# Patient Record
Sex: FEMALE | Race: WHITE | Employment: OTHER | URBAN - METROPOLITAN AREA
[De-identification: names, ages, dates, MRNs, and addresses within clinical notes are randomized per-mention and may not be internally consistent; named-entity substitution may affect disease eponyms.]

---

## 2022-04-07 ENCOUNTER — TELEPHONE (OUTPATIENT)
Dept: CARDIOTHORACIC SURGERY | Age: 77
End: 2022-04-07

## 2022-04-07 NOTE — TELEPHONE ENCOUNTER
LVM for patients daughter to call back and set the consultation appt. From [facility] Lopez for Permanent atrial fibrullation.

## 2022-04-14 ENCOUNTER — INITIAL CONSULT (OUTPATIENT)
Dept: CARDIOTHORACIC SURGERY | Age: 77
End: 2022-04-14
Payer: COMMERCIAL

## 2022-04-14 VITALS
OXYGEN SATURATION: 98 % | TEMPERATURE: 97 F | WEIGHT: 195 LBS | SYSTOLIC BLOOD PRESSURE: 112 MMHG | HEIGHT: 62 IN | BODY MASS INDEX: 35.88 KG/M2 | RESPIRATION RATE: 18 BRPM | HEART RATE: 54 BPM | DIASTOLIC BLOOD PRESSURE: 68 MMHG

## 2022-04-14 DIAGNOSIS — Z95.0 HISTORY OF PERMANENT CARDIAC PACEMAKER PLACEMENT: Primary | ICD-10-CM

## 2022-04-14 PROCEDURE — 99203 OFFICE O/P NEW LOW 30 MIN: CPT | Performed by: THORACIC SURGERY (CARDIOTHORACIC VASCULAR SURGERY)

## 2022-04-14 RX ORDER — TRIFLUOPERAZINE HYDROCHLORIDE 1 MG/1
1 TABLET, FILM COATED ORAL NIGHTLY
COMMUNITY
Start: 2021-12-16

## 2022-04-14 RX ORDER — DIGOXIN 125 MCG
125 TABLET ORAL DAILY
COMMUNITY
Start: 2022-03-30

## 2022-04-14 RX ORDER — TRAZODONE HYDROCHLORIDE 50 MG/1
50 TABLET ORAL NIGHTLY
COMMUNITY
Start: 2021-12-16

## 2022-04-14 RX ORDER — NITROGLYCERIN 0.4 MG/1
0.4 TABLET SUBLINGUAL EVERY 5 MIN PRN
Status: ON HOLD | COMMUNITY
Start: 2019-01-29 | End: 2022-09-14

## 2022-04-14 RX ORDER — BENZTROPINE MESYLATE 0.5 MG/1
0.5 TABLET ORAL DAILY
COMMUNITY
Start: 2022-03-17

## 2022-04-14 RX ORDER — POTASSIUM CHLORIDE 750 MG/1
30 TABLET, EXTENDED RELEASE ORAL DAILY
COMMUNITY
Start: 2021-12-29

## 2022-04-14 RX ORDER — METOPROLOL SUCCINATE 25 MG/1
12.5 TABLET, EXTENDED RELEASE ORAL EVERY EVENING
COMMUNITY
Start: 2021-12-29

## 2022-04-14 RX ORDER — BUMETANIDE 1 MG/1
1 TABLET ORAL DAILY
COMMUNITY
Start: 2022-03-11

## 2022-04-14 RX ORDER — SPIRONOLACTONE 25 MG/1
25 TABLET ORAL DAILY
COMMUNITY
Start: 2022-04-12

## 2022-04-14 NOTE — PATIENT INSTRUCTIONS
Hold xarelto 5 days prior to surgery   Communicate with Dr Renee Carosn to time surgery up next month

## 2022-04-14 NOTE — PROGRESS NOTES
[facility] Cardiothoracic Surgery  Consult    Patient's Name/Date of Birth: Emilia Mclean / 1945 (23 y.o.)    Date: April 14, 2022     Chief Complaint: Failed AICD pacemaker placement    HPI: Emilia Mclean is a 78 y.o.  female who presents from [address] [place] [address] Dr. Randy Scanlon after recent attempt of placing a AICD. The pacer with leads were unable to be placed due to patient's anatomy therefore she is seeking evaluation from cardiothoracic surgery Dr. Deann Lowe regarding a mini sternotomy to assist with placing the leads. Patient does suffer with severe congestive heart failure and severe anxiety. She has history of tachybradycardia syndrome. A. fib RVR. Currently she has no chest pain or shortness of breath. She does take anticoagulant Xarelto. ROS:   CONSTITUTIONAL: Mild anxiety  Respiratory: negative shortness of breath  Cardiovascular: Positive dizziness and fatigue with longstanding episodes  Gastrointestinal: Negative abdominal pain  Genitourinary:negative urinary issues  Hematologic/lymphatic: negative bleeding issues  Musculoskeletal: Lower leg weakness  Neurological mild delay cognitive recall chronic  Endocrine: negative diabetes  Psychiatric: Suffers with severe anxiety  No past medical history on file. No past surgical history on file. Allergies   Allergen Reactions    Penicillins Hives     No family history on file.   Social History     Socioeconomic History    Marital status: Unknown     Spouse name: Not on file    Number of children: Not on file    Years of education: Not on file    Highest education level: Not on file   Occupational History    Not on file   Tobacco Use    Smoking status: Never Smoker    Smokeless tobacco: Never Used   Substance and Sexual Activity    Alcohol use: Not on file    Drug use: Not on file    Sexual activity: Not on file   Other Topics Concern    Not on file   Social History Narrative    Not on file     Social Determinants of Health     Financial Resource Strain:     Difficulty of Paying Living Expenses: Not on file   Food Insecurity:     Worried About Running Out of Food in the Last Year: Not on file    Lynne of Food in the Last Year: Not on file   Transportation Needs:     Lack of Transportation (Medical): Not on file    Lack of Transportation (Non-Medical):  Not on file   Physical Activity:     Days of Exercise per Week: Not on file    Minutes of Exercise per Session: Not on file   Stress:     Feeling of Stress : Not on file   Social Connections:     Frequency of Communication with Friends and Family: Not on file    Frequency of Social Gatherings with Friends and Family: Not on file    Attends Spiritism Services: Not on file    Active Member of [address] [place] [address] or Organizations: Not on file    Attends Club or Organization Meetings: Not on file    Marital Status: Not on file   Intimate Partner Violence:     Fear of Current or Ex-Partner: Not on file    Emotionally Abused: Not on file    Physically Abused: Not on file    Sexually Abused: Not on file   Housing Stability:     Unable to Pay for Housing in the Last Year: Not on file    Number of [place] in the Last Year: Not on file    Unstable Housing in the Last Year: Not on file       Current Outpatient Medications   Medication Sig Dispense Refill    bumetanide (BUMEX) 1 MG tablet Take 1 mg by mouth daily      digoxin (LANOXIN) 125 MCG tablet Take 125 mcg by mouth daily      metoprolol succinate (TOPROL XL) 25 MG extended release tablet Take 12.5 mg by mouth daily      nitroGLYCERIN (NITROSTAT) 0.4 MG SL tablet Place 0.4 mg under the tongue every 5 minutes as needed      potassium chloride (KLOR-CON M) 10 MEQ extended release tablet Take 30 mEq by mouth daily      rivaroxaban (XARELTO) 20 MG TABS tablet Take 20 mg by mouth daily      spironolactone (ALDACTONE) 25 MG tablet Take 25 mg by mouth daily      traZODone (DESYREL) 50 MG tablet TAKE 1 TABLET BY MOUTH ONCE DAILY AT NIGHT      trifluoperazine (STELAZINE) 1 MG tablet Take 1 mg by mouth 2 times daily      benztropine (COGENTIN) 0.5 MG tablet TAKE 1 TABLET BY MOUTH ONCE DAILY       No current facility-administered medications for this visit. Physical Exam:  Vitals:    04/14/22 1136   BP: 112/68   Pulse: 54   Resp: 18   Temp: 97 °F (36.1 °C)   SpO2: 98%     Weight: Weight: 195 lb (88.5 kg)    Weight: 195 lb (88.5 kg)        General: Alert and Oriented x3. Sitting up in bed. No apparent distress. HEENT:  Normocephalic and atraumatic. PERRL. EOMI. Lips and oral mucosa moist and without lesions. Neck:  Supple. Trachea midline. Chest:  No abnormality. Equal and symmetric expansion with respiration. Lungs:  Clear to auscultation. Cardiac:  Regular rate and rhythm without murmurs, rubs or gallops. Abdomen:  Soft, non-tender, normoactive bowel sounds. No masses or organomegaly. Extremities:  No cyanosis, clubbing, or edema. Intact pulses in all four extremities. Musculoskeletal:  Intact range of motion of peripheral joints. Normal muscular strength. Neurologic:  Cranial nerves are grossly intact. Non-focal sensory deficits on exam.  Psychiatric: Mood and affect are appropriate.       Imaging Studies:    Cardiac Cath: N/A    Echo: N/A    CT: N/A    Prior Labs reviewed: No labs or concerns noted    Assessment   AICD placement    Risks Reviewed w/pt any sternotomy for lead wire placement  -  - Risk for bleeding:Yes  - Risk for cardiac arrythmias: Yes  - Small risk of death: Yes  - Risks of pneumonia from ventilator: Yes  - Risk for infection: Yes    PLAN:  Ct chest ordered  Patient and CTS will discuss with Dr Oli Medrano from [address] [place] [address] cardiology to discuss   Mini sternotomy for LV lead placement  PAT blood work up to date   Hold Xarelto 5 days prior to surgery   Get all imaging from [facility]  30 minutes spent with patient    Maria Victoria Hanley, APRN - NP, CNP  Phone: [phone]

## 2022-04-27 ENCOUNTER — TELEPHONE (OUTPATIENT)
Dept: CARDIOTHORACIC SURGERY | Age: 77
End: 2022-04-27

## 2022-04-27 NOTE — TELEPHONE ENCOUNTER
Loren Roberts from Dr Elise Conti office called, [id number], and she was asking for the particulars of surgery, day and time. Please advise.

## 2022-04-29 NOTE — TELEPHONE ENCOUNTER
We are awaiting for the two surgeons to discuss a date for surgery together.    Dr. Alma Hebert needs privileges for Vs cath lab  We will have to reach out to Dr Debby Núñez this coming week

## 2022-07-14 ENCOUNTER — HOSPITAL ENCOUNTER (OUTPATIENT)
Dept: CT IMAGING | Age: 77
Discharge: HOME | End: 2022-07-16
Payer: COMMERCIAL

## 2022-07-14 DIAGNOSIS — Z95.0 HISTORY OF PERMANENT CARDIAC PACEMAKER PLACEMENT: ICD-10-CM

## 2022-07-14 PROCEDURE — 71250 CT THORAX DX C-: CPT

## 2022-08-12 ENCOUNTER — TELEPHONE (OUTPATIENT)
Dept: CARDIOTHORACIC SURGERY | Age: 77
End: 2022-08-12

## 2022-08-12 NOTE — TELEPHONE ENCOUNTER
Lorenzo Puentes [phone]  We are awaiting for the two surgeons to discuss a date for surgery together. Dr. Curt Mcdaniel needs privileges for Vs cath lab  We will have to reach out to Dr Elysa Felty this coming week  This has been 4 months they have been waiting.   This has been scheduled

## 2022-09-08 ENCOUNTER — HOSPITAL ENCOUNTER (OUTPATIENT)
Dept: GENERAL RADIOLOGY | Age: 77
Discharge: HOME | End: 2022-09-10
Payer: COMMERCIAL

## 2022-09-08 ENCOUNTER — HOSPITAL ENCOUNTER (OUTPATIENT)
Dept: PREADMISSION TESTING | Age: 77
Discharge: HOME | End: 2022-09-12
Payer: COMMERCIAL

## 2022-09-08 VITALS
DIASTOLIC BLOOD PRESSURE: 84 MMHG | TEMPERATURE: 97.1 F | HEIGHT: 63 IN | BODY MASS INDEX: 33.28 KG/M2 | RESPIRATION RATE: 16 BRPM | SYSTOLIC BLOOD PRESSURE: 101 MMHG | OXYGEN SATURATION: 99 % | HEART RATE: 79 BPM | WEIGHT: 187.83 LBS

## 2022-09-08 LAB
ABSOLUTE EOS #: 0.19 K/UL (ref 0–0.44)
ABSOLUTE IMMATURE GRANULOCYTE: 0.03 K/UL (ref 0–0.3)
ABSOLUTE LYMPH #: 1.36 K/UL (ref 1.1–3.7)
ABSOLUTE MONO #: 0.48 K/UL (ref 0.1–1.2)
ALBUMIN SERPL-MCNC: 3.7 G/DL (ref 3.5–5.2)
ALLEN TEST: POSITIVE
ALP BLD-CCNC: 99 U/L (ref 35–104)
ALT SERPL-CCNC: 8 U/L (ref 5–33)
ANION GAP SERPL CALCULATED.3IONS-SCNC: 9 MMOL/L (ref 9–17)
AST SERPL-CCNC: 18 U/L
BACTERIA: ABNORMAL
BASOPHILS # BLD: 1 % (ref 0–2)
BASOPHILS ABSOLUTE: 0.04 K/UL (ref 0–0.2)
BILIRUB SERPL-MCNC: 0.8 MG/DL (ref 0.3–1.2)
BILIRUBIN URINE: NEGATIVE
BUN BLDV-MCNC: 8 MG/DL (ref 8–23)
BUN/CREAT BLD: 11 (ref 9–20)
CALCIUM SERPL-MCNC: 9.2 MG/DL (ref 8.6–10.4)
CHLORIDE BLD-SCNC: 104 MMOL/L (ref 98–107)
CO2: 28 MMOL/L (ref 20–31)
COLOR: YELLOW
CREAT SERPL-MCNC: 0.76 MG/DL (ref 0.5–0.9)
DIGOXIN LEVEL: 1.1 NG/ML (ref 0.5–2)
EOSINOPHILS RELATIVE PERCENT: 3 % (ref 1–4)
EPITHELIAL CELLS UA: ABNORMAL /HPF (ref 0–5)
GFR AFRICAN AMERICAN: >60 ML/MIN
GFR NON-AFRICAN AMERICAN: >60 ML/MIN
GFR SERPL CREATININE-BSD FRML MDRD: ABNORMAL ML/MIN/{1.73_M2}
GLUCOSE BLD-MCNC: 122 MG/DL (ref 70–99)
GLUCOSE URINE: NEGATIVE
HCT VFR BLD CALC: 42.4 % (ref 36.3–47.1)
HEMOGLOBIN: 13.5 G/DL (ref 11.9–15.1)
IMMATURE GRANULOCYTES: 0 %
INR BLD: 1.8
KETONES, URINE: NEGATIVE
LEUKOCYTE ESTERASE, URINE: ABNORMAL
LYMPHOCYTES # BLD: 19 % (ref 24–43)
MCH RBC QN AUTO: 29.5 PG (ref 25.2–33.5)
MCHC RBC AUTO-ENTMCNC: 31.8 G/DL (ref 28.4–34.8)
MCV RBC AUTO: 92.6 FL (ref 82.6–102.9)
MONOCYTES # BLD: 7 % (ref 3–12)
NITRITE, URINE: POSITIVE
NRBC AUTOMATED: 0 PER 100 WBC
PARTIAL THROMBOPLASTIN TIME: 32.8 SEC (ref 23.9–33.8)
PATIENT TEMP: 37
PDW BLD-RTO: 14.2 % (ref 11.8–14.4)
PH UA: 6 (ref 5–8)
PLATELET # BLD: 182 K/UL (ref 138–453)
PMV BLD AUTO: 11.1 FL (ref 8.1–13.5)
POC HCO3: 24.8 MMOL/L (ref 21–28)
POC O2 SATURATION: 96 % (ref 94–98)
POC PCO2: 36.8 MM HG (ref 35–48)
POC PH: 7.44 (ref 7.35–7.45)
POC PO2: 75.9 MM HG (ref 83–108)
POC TCO2: 25 MMOL/L (ref 22–30)
POSITIVE BASE EXCESS, ART: 1 (ref 0–3)
POTASSIUM SERPL-SCNC: 4.3 MMOL/L (ref 3.7–5.3)
PROTEIN UA: NEGATIVE
PROTHROMBIN TIME: 21.2 SEC (ref 11.5–14.2)
RBC # BLD: 4.58 M/UL (ref 3.95–5.11)
RBC UA: ABNORMAL /HPF (ref 0–2)
SAMPLE SITE: ABNORMAL
SEG NEUTROPHILS: 70 % (ref 36–65)
SEGMENTED NEUTROPHILS ABSOLUTE COUNT: 5.24 K/UL (ref 1.5–8.1)
SODIUM BLD-SCNC: 141 MMOL/L (ref 135–144)
SPECIFIC GRAVITY UA: 1.03 (ref 1–1.03)
TOTAL PROTEIN: 6.7 G/DL (ref 6.4–8.3)
TURBIDITY: ABNORMAL
URINE HGB: ABNORMAL
UROBILINOGEN, URINE: NORMAL
WBC # BLD: 7.3 K/UL (ref 3.5–11.3)
WBC UA: ABNORMAL /HPF (ref 0–5)

## 2022-09-08 PROCEDURE — 82374 ASSAY BLOOD CARBON DIOXIDE: CPT

## 2022-09-08 PROCEDURE — 86403 PARTICLE AGGLUT ANTBDY SCRN: CPT

## 2022-09-08 PROCEDURE — 36600 WITHDRAWAL OF ARTERIAL BLOOD: CPT

## 2022-09-08 PROCEDURE — 87086 URINE CULTURE/COLONY COUNT: CPT

## 2022-09-08 PROCEDURE — 86920 COMPATIBILITY TEST SPIN: CPT

## 2022-09-08 PROCEDURE — 36415 COLL VENOUS BLD VENIPUNCTURE: CPT

## 2022-09-08 PROCEDURE — 71046 X-RAY EXAM CHEST 2 VIEWS: CPT

## 2022-09-08 PROCEDURE — 87186 SC STD MICRODIL/AGAR DIL: CPT

## 2022-09-08 PROCEDURE — 86850 RBC ANTIBODY SCREEN: CPT

## 2022-09-08 PROCEDURE — 86900 BLOOD TYPING SEROLOGIC ABO: CPT

## 2022-09-08 PROCEDURE — 85730 THROMBOPLASTIN TIME PARTIAL: CPT

## 2022-09-08 PROCEDURE — 86901 BLOOD TYPING SEROLOGIC RH(D): CPT

## 2022-09-08 PROCEDURE — 80162 ASSAY OF DIGOXIN TOTAL: CPT

## 2022-09-08 PROCEDURE — 85610 PROTHROMBIN TIME: CPT

## 2022-09-08 PROCEDURE — 80053 COMPREHEN METABOLIC PANEL: CPT

## 2022-09-08 PROCEDURE — 87641 MR-STAPH DNA AMP PROBE: CPT

## 2022-09-08 PROCEDURE — 85025 COMPLETE CBC W/AUTO DIFF WBC: CPT

## 2022-09-08 PROCEDURE — 87077 CULTURE AEROBIC IDENTIFY: CPT

## 2022-09-08 PROCEDURE — 82803 BLOOD GASES ANY COMBINATION: CPT

## 2022-09-08 PROCEDURE — 81001 URINALYSIS AUTO W/SCOPE: CPT

## 2022-09-08 RX ORDER — LEVOTHYROXINE SODIUM 0.03 MG/1
25 TABLET ORAL DAILY
COMMUNITY
Start: 2022-04-21

## 2022-09-08 ASSESSMENT — PAIN SCALES - GENERAL: PAINLEVEL_OUTOF10: 0

## 2022-09-08 NOTE — PRE-PROCEDURE INSTRUCTIONS
given Chlorhexidine, please shower or bathe the morning of surgery using an antibacterial soap. Please dress in clean clothing after showering. General information about Chlorhexidine Gluconate (CHG)   * It should not be used on hair, face, ears, genital area or skin that is not intact. * It should not be used if breast feeding. * It should not be used if you allergic to CHG. * See the bottle for additional information. Do Not apply any powder, deodorant, lotion/cream/oil, perfume/aftershave, cosmetics, or alcohol-based skin or hair products after showering. Do Not shave near the planned surgical site unless specifically instructed to.     1. Wash your hair using your normal shampoo and rinse. 2. Wash your face and genital area (privates) using your own shampoo and rinse. 3. Turn off the shower water and pour half of the bottle of CHG onto a clean washcloth. 4. Wash your body from neck down to toes. Pay special attention to your surgical site. 5. Leave the CHG on your skin for 5 minutes and rinse it off.   6. Pat dry with a clean towel, sleep in clean clothes and clean sheets. 7. Do not shave near the surgical site. 8. Repeat process the morning of surgery. CHG may cause dry skin, but should not cause redness, rash, or intense itching. If an suspect an allergic reaction, use your own soap and shampoo for the morning of surgery and report reaction to the pre-operative nurse. Additional Instructions:      1. If you are having any type of anesthesia, you are to have NOTHING to eat or drink after midnight the night before surgery. This includes no gum, hard candy, mints or water. The only exception to this is small sips of water to take the medications listed above. No smoking or chewing tobacco after midnight. No alcoholic beverages for 24 hours prior to surgery. 2. Brush your teeth but do not swallow any water. 3. If you wear glasses bring a case for them if you have one.   No contacts should be worn the day of surgery. You may also bring your hearing aids. If you have dentures, most surgical procedures involving anesthesia will require that you remove them prior to surgery. 4. If you sleep with a CPAP or BiPAP machine at home and plan on staying in the hospital overnight after surgery, please bring your machine with you. 5. Do not wear any jewelry or body piercings the day of surgery. No nail polish on the operative extremity (arm/hand or leg/foot surgeries)   6. If you are staying overnight with us, you may bring a small bag of necessary personal items. 7. Please wear loose, comfortable clothing. If you are potentially going to have a cast, sling, brace or bulky dressing, make sure to wear clothing that will fit over it. 8. In case of illness - If you have cold or flu like symptoms (high fever, runny nose, sore throat, cough, etc.) rash, nausea, vomiting, loose stools, and/or recent contact with someone who has a contagious disease (chicken pox, measles, COVID-19, etc.). Please call your surgeon before coming to the hospital.    Transportation After Your Surgery/Procedure: If you are going home the same day of surgery you need someone to drive you home. Your  must be at least 25years of age. A taxi cab or other nonmedical public transportation is not acceptable unless you have someone to ride home in the vehicle with you. For your safety, someone must remain with you for the first 24 hours after your surgery if you receive anesthesia or medication. If you do not have someone to stay with you, your procedure may be cancelled. As a patient at [facility] you can expect quality medical and nursing care that is centered on you individual needs. Our goal is to make your surgical experience as comfortable as possible.     Any questions about preparing for your surgery please call [phone].      ____________________________   ____________________________  Signature (Patient)                                 Signature (Nurse)                     Date

## 2022-09-09 LAB
CULTURE: ABNORMAL
CULTURE: ABNORMAL
MRSA, DNA, NASAL: NEGATIVE
SPECIMEN DESCRIPTION: ABNORMAL
SPECIMEN DESCRIPTION: NORMAL

## 2022-09-12 ENCOUNTER — ANESTHESIA EVENT (OUTPATIENT)
Dept: OPERATING ROOM | Age: 77
DRG: 261 | End: 2022-09-12
Payer: COMMERCIAL

## 2022-09-13 ENCOUNTER — ANESTHESIA (OUTPATIENT)
Dept: OPERATING ROOM | Age: 77
DRG: 261 | End: 2022-09-13
Payer: COMMERCIAL

## 2022-09-13 ENCOUNTER — APPOINTMENT (OUTPATIENT)
Dept: GENERAL RADIOLOGY | Age: 77
DRG: 261 | End: 2022-09-13
Attending: THORACIC SURGERY (CARDIOTHORACIC VASCULAR SURGERY)
Payer: COMMERCIAL

## 2022-09-13 ENCOUNTER — HOSPITAL ENCOUNTER (INPATIENT)
Age: 77
LOS: 1 days | Discharge: HOME | DRG: 261 | End: 2022-09-16
Attending: THORACIC SURGERY (CARDIOTHORACIC VASCULAR SURGERY) | Admitting: THORACIC SURGERY (CARDIOTHORACIC VASCULAR SURGERY)
Payer: COMMERCIAL

## 2022-09-13 DIAGNOSIS — Z53.32 THORACOSCOPIC SURGICAL PROCEDURE CONVERTED TO OPEN PROCEDURE: Primary | ICD-10-CM

## 2022-09-13 PROBLEM — I48.11 LONGSTANDING PERSISTENT ATRIAL FIBRILLATION (HCC): Status: ACTIVE | Noted: 2022-09-13

## 2022-09-13 LAB
ANION GAP: 14 MMOL/L (ref 7–16)
DIGOXIN LEVEL: 1 NG/ML (ref 0.5–2)
FIO2: 2
GLUCOSE BLD-MCNC: 158 MG/DL (ref 74–100)
NEGATIVE BASE EXCESS, ART: 2 (ref 0–2)
NEGATIVE BASE EXCESS, ART: 2 (ref 0–2)
O2 DEVICE/FLOW/%: ABNORMAL
POC CHLORIDE: 106 MMOL/L (ref 98–107)
POC HCO3: 22.9 MMOL/L (ref 21–28)
POC HCO3: 24.4 MMOL/L (ref 21–28)
POC HEMATOCRIT: 34 % (ref 36–46)
POC HEMOGLOBIN: 11.7 G/DL (ref 12–16)
POC IONIZED CALCIUM: 1.11 MMOL/L (ref 1.15–1.33)
POC O2 SATURATION: 92 % (ref 94–98)
POC O2 SATURATION: 98 % (ref 94–98)
POC PCO2: 38.4 MM HG (ref 35–48)
POC PCO2: 45.7 MM HG (ref 35–48)
POC PH: 7.33 (ref 7.35–7.45)
POC PH: 7.38 (ref 7.35–7.45)
POC PO2: 112.5 MM HG (ref 83–108)
POC PO2: 69.4 MM HG (ref 83–108)
POC POTASSIUM: 3.1 MMOL/L (ref 3.5–4.5)
POC SODIUM: 142 MMOL/L (ref 138–146)
POC TCO2: 23 MMOL/L (ref 22–30)
SAMPLE SITE: ABNORMAL

## 2022-09-13 PROCEDURE — 6360000002 HC RX W HCPCS: Performed by: NURSE PRACTITIONER

## 2022-09-13 PROCEDURE — 2580000003 HC RX 258: Performed by: NURSE PRACTITIONER

## 2022-09-13 PROCEDURE — 82947 ASSAY GLUCOSE BLOOD QUANT: CPT

## 2022-09-13 PROCEDURE — 2580000003 HC RX 258: Performed by: THORACIC SURGERY (CARDIOTHORACIC VASCULAR SURGERY)

## 2022-09-13 PROCEDURE — 2500000003 HC RX 250 WO HCPCS: Performed by: THORACIC SURGERY (CARDIOTHORACIC VASCULAR SURGERY)

## 2022-09-13 PROCEDURE — A4217 STERILE WATER/SALINE, 500 ML: HCPCS | Performed by: THORACIC SURGERY (CARDIOTHORACIC VASCULAR SURGERY)

## 2022-09-13 PROCEDURE — 2500000003 HC RX 250 WO HCPCS: Performed by: NURSE ANESTHETIST, CERTIFIED REGISTERED

## 2022-09-13 PROCEDURE — 3700000001 HC ADD 15 MINUTES (ANESTHESIA): Performed by: THORACIC SURGERY (CARDIOTHORACIC VASCULAR SURGERY)

## 2022-09-13 PROCEDURE — 71045 X-RAY EXAM CHEST 1 VIEW: CPT

## 2022-09-13 PROCEDURE — 02H40JZ INSERTION OF PACEMAKER LEAD INTO CORONARY VEIN, OPEN APPROACH: ICD-10-PCS | Performed by: THORACIC SURGERY (CARDIOTHORACIC VASCULAR SURGERY)

## 2022-09-13 PROCEDURE — 82803 BLOOD GASES ANY COMBINATION: CPT

## 2022-09-13 PROCEDURE — 80162 ASSAY OF DIGOXIN TOTAL: CPT

## 2022-09-13 PROCEDURE — 2500000003 HC RX 250 WO HCPCS

## 2022-09-13 PROCEDURE — 33202 INSERT EPICARD ELTRD OPEN: CPT | Performed by: THORACIC SURGERY (CARDIOTHORACIC VASCULAR SURGERY)

## 2022-09-13 PROCEDURE — 85014 HEMATOCRIT: CPT

## 2022-09-13 PROCEDURE — 6360000002 HC RX W HCPCS: Performed by: ANESTHESIOLOGY

## 2022-09-13 PROCEDURE — 2709999900 HC NON-CHARGEABLE SUPPLY: Performed by: THORACIC SURGERY (CARDIOTHORACIC VASCULAR SURGERY)

## 2022-09-13 PROCEDURE — 36415 COLL VENOUS BLD VENIPUNCTURE: CPT

## 2022-09-13 PROCEDURE — 82330 ASSAY OF CALCIUM: CPT

## 2022-09-13 PROCEDURE — 3600000002 HC SURGERY LEVEL 2 BASE: Performed by: THORACIC SURGERY (CARDIOTHORACIC VASCULAR SURGERY)

## 2022-09-13 PROCEDURE — 2000000000 HC ICU R&B

## 2022-09-13 PROCEDURE — 6360000002 HC RX W HCPCS: Performed by: THORACIC SURGERY (CARDIOTHORACIC VASCULAR SURGERY)

## 2022-09-13 PROCEDURE — C1729 CATH, DRAINAGE: HCPCS | Performed by: THORACIC SURGERY (CARDIOTHORACIC VASCULAR SURGERY)

## 2022-09-13 PROCEDURE — 6370000000 HC RX 637 (ALT 250 FOR IP): Performed by: NURSE PRACTITIONER

## 2022-09-13 PROCEDURE — 05HM33Z INSERTION OF INFUSION DEVICE INTO RIGHT INTERNAL JUGULAR VEIN, PERCUTANEOUS APPROACH: ICD-10-PCS | Performed by: ANESTHESIOLOGY

## 2022-09-13 PROCEDURE — 7100000001 HC PACU RECOVERY - ADDTL 15 MIN: Performed by: THORACIC SURGERY (CARDIOTHORACIC VASCULAR SURGERY)

## 2022-09-13 PROCEDURE — 3700000000 HC ANESTHESIA ATTENDED CARE: Performed by: THORACIC SURGERY (CARDIOTHORACIC VASCULAR SURGERY)

## 2022-09-13 PROCEDURE — 6360000002 HC RX W HCPCS

## 2022-09-13 PROCEDURE — 80051 ELECTROLYTE PANEL: CPT

## 2022-09-13 PROCEDURE — 6360000002 HC RX W HCPCS: Performed by: NURSE ANESTHETIST, CERTIFIED REGISTERED

## 2022-09-13 PROCEDURE — 2580000003 HC RX 258

## 2022-09-13 PROCEDURE — 2580000003 HC RX 258: Performed by: ANESTHESIOLOGY

## 2022-09-13 PROCEDURE — C1898 LEAD, PMKR, OTHER THAN TRANS: HCPCS | Performed by: THORACIC SURGERY (CARDIOTHORACIC VASCULAR SURGERY)

## 2022-09-13 PROCEDURE — 3600000012 HC SURGERY LEVEL 2 ADDTL 15MIN: Performed by: THORACIC SURGERY (CARDIOTHORACIC VASCULAR SURGERY)

## 2022-09-13 PROCEDURE — 7100000000 HC PACU RECOVERY - FIRST 15 MIN: Performed by: THORACIC SURGERY (CARDIOTHORACIC VASCULAR SURGERY)

## 2022-09-13 DEVICE — IMPLANTABLE DEVICE: Type: IMPLANTABLE DEVICE | Site: HEART | Status: FUNCTIONAL

## 2022-09-13 RX ORDER — MORPHINE SULFATE 2 MG/ML
2 INJECTION, SOLUTION INTRAMUSCULAR; INTRAVENOUS
Status: DISCONTINUED | OUTPATIENT
Start: 2022-09-13 | End: 2022-09-16 | Stop reason: HOSPADM

## 2022-09-13 RX ORDER — SODIUM CHLORIDE 0.9 % (FLUSH) 0.9 %
5-40 SYRINGE (ML) INJECTION EVERY 12 HOURS SCHEDULED
Status: DISCONTINUED | OUTPATIENT
Start: 2022-09-13 | End: 2022-09-13 | Stop reason: HOSPADM

## 2022-09-13 RX ORDER — ETOMIDATE 2 MG/ML
INJECTION INTRAVENOUS PRN
Status: DISCONTINUED | OUTPATIENT
Start: 2022-09-13 | End: 2022-09-13 | Stop reason: SDUPTHER

## 2022-09-13 RX ORDER — SODIUM CHLORIDE 0.9 % (FLUSH) 0.9 %
5-40 SYRINGE (ML) INJECTION PRN
Status: DISCONTINUED | OUTPATIENT
Start: 2022-09-13 | End: 2022-09-13 | Stop reason: HOSPADM

## 2022-09-13 RX ORDER — OXYCODONE HYDROCHLORIDE 5 MG/1
10 TABLET ORAL EVERY 4 HOURS PRN
Status: DISCONTINUED | OUTPATIENT
Start: 2022-09-13 | End: 2022-09-16 | Stop reason: HOSPADM

## 2022-09-13 RX ORDER — BUMETANIDE 1 MG/1
1 TABLET ORAL DAILY
Status: DISCONTINUED | OUTPATIENT
Start: 2022-09-13 | End: 2022-09-16 | Stop reason: HOSPADM

## 2022-09-13 RX ORDER — LIDOCAINE HYDROCHLORIDE 10 MG/ML
1 INJECTION, SOLUTION EPIDURAL; INFILTRATION; INTRACAUDAL; PERINEURAL
Status: DISCONTINUED | OUTPATIENT
Start: 2022-09-14 | End: 2022-09-13 | Stop reason: HOSPADM

## 2022-09-13 RX ORDER — POTASSIUM CHLORIDE 29.8 MG/ML
INJECTION INTRAVENOUS PRN
Status: DISCONTINUED | OUTPATIENT
Start: 2022-09-13 | End: 2022-09-13 | Stop reason: SDUPTHER

## 2022-09-13 RX ORDER — LEVOTHYROXINE SODIUM 0.03 MG/1
25 TABLET ORAL DAILY
Status: DISCONTINUED | OUTPATIENT
Start: 2022-09-14 | End: 2022-09-16 | Stop reason: HOSPADM

## 2022-09-13 RX ORDER — METOPROLOL SUCCINATE 25 MG/1
12.5 TABLET, EXTENDED RELEASE ORAL DAILY
Status: DISCONTINUED | OUTPATIENT
Start: 2022-09-14 | End: 2022-09-16 | Stop reason: HOSPADM

## 2022-09-13 RX ORDER — FENTANYL CITRATE 50 UG/ML
25 INJECTION, SOLUTION INTRAMUSCULAR; INTRAVENOUS EVERY 5 MIN PRN
Status: DISCONTINUED | OUTPATIENT
Start: 2022-09-13 | End: 2022-09-13 | Stop reason: HOSPADM

## 2022-09-13 RX ORDER — LIDOCAINE HYDROCHLORIDE 20 MG/ML
INJECTION, SOLUTION EPIDURAL; INFILTRATION; INTRACAUDAL; PERINEURAL PRN
Status: DISCONTINUED | OUTPATIENT
Start: 2022-09-13 | End: 2022-09-13 | Stop reason: SDUPTHER

## 2022-09-13 RX ORDER — ROCURONIUM BROMIDE 10 MG/ML
INJECTION, SOLUTION INTRAVENOUS PRN
Status: DISCONTINUED | OUTPATIENT
Start: 2022-09-13 | End: 2022-09-13 | Stop reason: SDUPTHER

## 2022-09-13 RX ORDER — SODIUM CHLORIDE 9 MG/ML
INJECTION, SOLUTION INTRAVENOUS PRN
Status: DISCONTINUED | OUTPATIENT
Start: 2022-09-13 | End: 2022-09-13 | Stop reason: HOSPADM

## 2022-09-13 RX ORDER — FAMOTIDINE 20 MG/1
20 TABLET, FILM COATED ORAL 2 TIMES DAILY
Status: DISCONTINUED | OUTPATIENT
Start: 2022-09-13 | End: 2022-09-16 | Stop reason: HOSPADM

## 2022-09-13 RX ORDER — ONDANSETRON 2 MG/ML
4 INJECTION INTRAMUSCULAR; INTRAVENOUS EVERY 6 HOURS PRN
Status: DISCONTINUED | OUTPATIENT
Start: 2022-09-13 | End: 2022-09-16 | Stop reason: HOSPADM

## 2022-09-13 RX ORDER — POTASSIUM CHLORIDE 750 MG/1
30 CAPSULE, EXTENDED RELEASE ORAL DAILY
Status: DISCONTINUED | OUTPATIENT
Start: 2022-09-13 | End: 2022-09-15 | Stop reason: SDUPTHER

## 2022-09-13 RX ORDER — BUPIVACAINE HYDROCHLORIDE 5 MG/ML
INJECTION, SOLUTION EPIDURAL; INTRACAUDAL PRN
Status: DISCONTINUED | OUTPATIENT
Start: 2022-09-13 | End: 2022-09-13 | Stop reason: ALTCHOICE

## 2022-09-13 RX ORDER — SODIUM CHLORIDE 0.9 % (FLUSH) 0.9 %
5-40 SYRINGE (ML) INJECTION PRN
Status: DISCONTINUED | OUTPATIENT
Start: 2022-09-13 | End: 2022-09-16 | Stop reason: HOSPADM

## 2022-09-13 RX ORDER — ONDANSETRON 2 MG/ML
4 INJECTION INTRAMUSCULAR; INTRAVENOUS
Status: DISCONTINUED | OUTPATIENT
Start: 2022-09-13 | End: 2022-09-13 | Stop reason: HOSPADM

## 2022-09-13 RX ORDER — ONDANSETRON 2 MG/ML
INJECTION INTRAMUSCULAR; INTRAVENOUS PRN
Status: DISCONTINUED | OUTPATIENT
Start: 2022-09-13 | End: 2022-09-13 | Stop reason: SDUPTHER

## 2022-09-13 RX ORDER — ONDANSETRON 4 MG/1
4 TABLET, ORALLY DISINTEGRATING ORAL EVERY 8 HOURS PRN
Status: DISCONTINUED | OUTPATIENT
Start: 2022-09-13 | End: 2022-09-16 | Stop reason: HOSPADM

## 2022-09-13 RX ORDER — HYDROMORPHONE HYDROCHLORIDE 1 MG/ML
0.5 INJECTION, SOLUTION INTRAMUSCULAR; INTRAVENOUS; SUBCUTANEOUS EVERY 5 MIN PRN
Status: DISCONTINUED | OUTPATIENT
Start: 2022-09-13 | End: 2022-09-13 | Stop reason: HOSPADM

## 2022-09-13 RX ORDER — FENTANYL CITRATE 50 UG/ML
INJECTION, SOLUTION INTRAMUSCULAR; INTRAVENOUS PRN
Status: DISCONTINUED | OUTPATIENT
Start: 2022-09-13 | End: 2022-09-13 | Stop reason: SDUPTHER

## 2022-09-13 RX ORDER — DIGOXIN 125 MCG
125 TABLET ORAL DAILY
Status: DISCONTINUED | OUTPATIENT
Start: 2022-09-14 | End: 2022-09-16 | Stop reason: HOSPADM

## 2022-09-13 RX ORDER — SODIUM CHLORIDE, SODIUM LACTATE, POTASSIUM CHLORIDE, CALCIUM CHLORIDE 600; 310; 30; 20 MG/100ML; MG/100ML; MG/100ML; MG/100ML
INJECTION, SOLUTION INTRAVENOUS CONTINUOUS
Status: DISCONTINUED | OUTPATIENT
Start: 2022-09-14 | End: 2022-09-13

## 2022-09-13 RX ORDER — OXYCODONE HYDROCHLORIDE 5 MG/1
5 TABLET ORAL EVERY 4 HOURS PRN
Status: DISCONTINUED | OUTPATIENT
Start: 2022-09-13 | End: 2022-09-16 | Stop reason: HOSPADM

## 2022-09-13 RX ORDER — CALCIUM CHLORIDE 100 MG/ML
INJECTION INTRAVENOUS; INTRAVENTRICULAR PRN
Status: DISCONTINUED | OUTPATIENT
Start: 2022-09-13 | End: 2022-09-13 | Stop reason: SDUPTHER

## 2022-09-13 RX ORDER — SODIUM CHLORIDE 9 MG/ML
INJECTION, SOLUTION INTRAVENOUS PRN
Status: DISCONTINUED | OUTPATIENT
Start: 2022-09-13 | End: 2022-09-16 | Stop reason: HOSPADM

## 2022-09-13 RX ORDER — SODIUM CHLORIDE 0.9 % (FLUSH) 0.9 %
5-40 SYRINGE (ML) INJECTION EVERY 12 HOURS SCHEDULED
Status: DISCONTINUED | OUTPATIENT
Start: 2022-09-13 | End: 2022-09-16 | Stop reason: HOSPADM

## 2022-09-13 RX ADMIN — Medication 100 MCG: at 14:06

## 2022-09-13 RX ADMIN — FENTANYL CITRATE 25 MCG: 50 INJECTION INTRAMUSCULAR; INTRAVENOUS at 17:56

## 2022-09-13 RX ADMIN — ROCURONIUM BROMIDE 25 MG: 10 INJECTION, SOLUTION INTRAVENOUS at 14:50

## 2022-09-13 RX ADMIN — ROCURONIUM BROMIDE 25 MG: 10 INJECTION, SOLUTION INTRAVENOUS at 15:14

## 2022-09-13 RX ADMIN — SODIUM CHLORIDE, PRESERVATIVE FREE 10 ML: 5 INJECTION INTRAVENOUS at 22:06

## 2022-09-13 RX ADMIN — POTASSIUM CHLORIDE 20 MEQ: 29.8 INJECTION, SOLUTION INTRAVENOUS at 16:00

## 2022-09-13 RX ADMIN — SODIUM CHLORIDE, POTASSIUM CHLORIDE, SODIUM LACTATE AND CALCIUM CHLORIDE: 600; 310; 30; 20 INJECTION, SOLUTION INTRAVENOUS at 13:11

## 2022-09-13 RX ADMIN — VANCOMYCIN HYDROCHLORIDE 1250 MG: 5 INJECTION, POWDER, LYOPHILIZED, FOR SOLUTION INTRAVENOUS at 13:58

## 2022-09-13 RX ADMIN — ROCURONIUM BROMIDE 50 MG: 10 INJECTION, SOLUTION INTRAVENOUS at 14:06

## 2022-09-13 RX ADMIN — BUMETANIDE 1 MG: 1 TABLET ORAL at 20:08

## 2022-09-13 RX ADMIN — EPINEPHRINE 10 MCG: 1 INJECTION PARENTERAL at 14:22

## 2022-09-13 RX ADMIN — ONDANSETRON 4 MG: 2 INJECTION INTRAMUSCULAR; INTRAVENOUS at 16:40

## 2022-09-13 RX ADMIN — EPINEPHRINE 10 MCG: 1 INJECTION PARENTERAL at 14:06

## 2022-09-13 RX ADMIN — ETOMIDATE 18 MG: 40 INJECTION, SOLUTION INTRAVENOUS at 14:06

## 2022-09-13 RX ADMIN — Medication 25 MCG: at 17:04

## 2022-09-13 RX ADMIN — LIDOCAINE HYDROCHLORIDE 80 MG: 20 INJECTION, SOLUTION EPIDURAL; INFILTRATION; INTRACAUDAL; PERINEURAL at 14:06

## 2022-09-13 RX ADMIN — CALCIUM CHLORIDE INJECTION 0.5 G: 100 INJECTION, SOLUTION INTRAVENOUS at 15:55

## 2022-09-13 RX ADMIN — Medication 12.5 MG: at 20:08

## 2022-09-13 RX ADMIN — FAMOTIDINE 20 MG: 20 TABLET, FILM COATED ORAL at 20:08

## 2022-09-13 RX ADMIN — POTASSIUM CHLORIDE 30 MEQ: 10 CAPSULE, COATED, EXTENDED RELEASE ORAL at 20:08

## 2022-09-13 RX ADMIN — MORPHINE SULFATE 2 MG: 2 INJECTION, SOLUTION INTRAMUSCULAR; INTRAVENOUS at 20:09

## 2022-09-13 RX ADMIN — SUGAMMADEX 200 MG: 100 INJECTION, SOLUTION INTRAVENOUS at 17:00

## 2022-09-13 ASSESSMENT — PAIN DESCRIPTION - PAIN TYPE
TYPE: SURGICAL PAIN
TYPE: SURGICAL PAIN

## 2022-09-13 ASSESSMENT — PAIN DESCRIPTION - ONSET
ONSET: ON-GOING
ONSET: PROGRESSIVE
ONSET: ON-GOING

## 2022-09-13 ASSESSMENT — PAIN - FUNCTIONAL ASSESSMENT
PAIN_FUNCTIONAL_ASSESSMENT: 0-10
PAIN_FUNCTIONAL_ASSESSMENT: ACTIVITIES ARE NOT PREVENTED

## 2022-09-13 ASSESSMENT — PAIN DESCRIPTION - DESCRIPTORS
DESCRIPTORS: PATIENT UNABLE TO DESCRIBE
DESCRIPTORS: ACHING

## 2022-09-13 ASSESSMENT — PAIN SCALES - GENERAL
PAINLEVEL_OUTOF10: 2
PAINLEVEL_OUTOF10: 5
PAINLEVEL_OUTOF10: 2
PAINLEVEL_OUTOF10: 8
PAINLEVEL_OUTOF10: 2

## 2022-09-13 ASSESSMENT — PAIN DESCRIPTION - LOCATION
LOCATION: BREAST
LOCATION: BREAST
LOCATION: CHEST

## 2022-09-13 ASSESSMENT — PAIN DESCRIPTION - ORIENTATION
ORIENTATION: LEFT

## 2022-09-13 ASSESSMENT — PAIN DESCRIPTION - FREQUENCY
FREQUENCY: CONTINUOUS

## 2022-09-13 NOTE — ANESTHESIA PRE PROCEDURE
Department of Anesthesiology  Preprocedure Note       Name:  Be Cai   Age:  78 y.o.  :  1945                                          MRN:  [id number]         Date:  2022      Surgeon: Millicent Soriano):  Lyle Gordon MD    Procedure: Procedure(s):  L V EPICARDIAL LEAD PLACEMENT AND LEFT MINI THORACOTOMY - MEDTRONIC    Medications prior to admission:   Prior to Admission medications    Medication Sig Start Date End Date Taking?  Authorizing Provider   levothyroxine (SYNTHROID) 25 MCG tablet Take 25 mcg by mouth daily 22   Historical Provider, MD   benztropine (COGENTIN) 0.5 MG tablet TAKE 1 TABLET BY MOUTH ONCE DAILY 3/17/22   Historical Provider, MD   bumetanide (BUMEX) 1 MG tablet Take 1 mg by mouth daily 3/11/22   Historical Provider, MD   digoxin (LANOXIN) 125 MCG tablet Take 125 mcg by mouth daily 3/30/22   Historical Provider, MD   metoprolol succinate (TOPROL XL) 25 MG extended release tablet Take 12.5 mg by mouth daily 21   Historical Provider, MD   nitroGLYCERIN (NITROSTAT) 0.4 MG SL tablet Place 0.4 mg under the tongue every 5 minutes as needed  Patient not taking: Reported on 2022   Historical Provider, MD   potassium chloride (KLOR-CON M) 10 MEQ extended release tablet Take 30 mEq by mouth daily 21   Historical Provider, MD   rivaroxaban (XARELTO) 20 MG TABS tablet Take 20 mg by mouth daily 21   Historical Provider, MD   spironolactone (ALDACTONE) 25 MG tablet Take 12.5 mg by mouth daily 22   Historical Provider, MD   traZODone (DESYREL) 50 MG tablet TAKE 1 TABLET BY MOUTH ONCE DAILY AT NIGHT 21   Historical Provider, MD   trifluoperazine (STELAZINE) 1 MG tablet Take 1 mg by mouth 2 times daily 21   Historical Provider, MD       Current medications:    Current Facility-Administered Medications   Medication Dose Route Frequency Provider Last Rate Last Admin    [START ON 2022] lidocaine PF 1 % injection 1 mL  1 mL IntraDERmal Once PRN MD Lisa Marqueznils Small ON 9/14/2022] lactated ringers infusion   IntraVENous Continuous Jacqueline Parekh MD 50 mL/hr at 09/13/22 1358 NoRateChange at 09/13/22 1358    sodium chloride flush 0.9 % injection 5-40 mL  5-40 mL IntraVENous 2 times per day Jacqueline Parekh MD        sodium chloride flush 0.9 % injection 5-40 mL  5-40 mL IntraVENous PRN Jacqueline Parekh MD        0.9 % sodium chloride infusion   IntraVENous PRN Jacqueline Parekh MD         Facility-Administered Medications Ordered in Other Encounters   Medication Dose Route Frequency Provider Last Rate Last Admin    rocuronium (ZEMURON) injection   IntraVENous PRN Ebenezer Bello RN   25 mg at 09/13/22 1514    lidocaine PF 2 % injection   IntraVENous PRN Ebenezer Bello RN   80 mg at 09/13/22 1406    etomidate (AMIDATE) injection   IntraVENous PRN Ebenezer Bello RN   18 mg at 09/13/22 1406    fentaNYL (SUBLIMAZE) injection   IntraVENous PRN Ebenezer Bello RN   100 mcg at 09/13/22 1406    EPINEPHrine (EPINEPHrine HCL) 5 mg in dextrose 5 % 250 mL infusion   IntraVENous Continuous PRN Ebenezer Bello RN   10 mcg at 09/13/22 1422       Allergies: Allergies   Allergen Reactions    Penicillins Hives       Problem List:  There is no problem list on file for this patient.       Past Medical History:        Diagnosis Date    Anxiety     Atrial fibrillation (HCC)     Chronic systolic heart failure ([address] 2016    Esophageal reflux     Gastric perforation (HCC)     Headache     ICD (implantable cardioverter-defibrillator) in place     MI (myocardial infarction) ([address]     Nonischemic cardiomyopathy ([address]     Obesity     Recurrent major depressive disorder ([facility]     Thyroid disease        Past Surgical History:        Procedure Laterality Date    ANKLE SURGERY      CARDIAC CATHETERIZATION      CARDIAC DEFIBRILLATOR PLACEMENT  03/10/2022    BiV ICD Implant (Dr. Letitia Hogan - [facility])    CARDIAC ELECTROPHYSIOLOGY STUDY & DFT  03/04/2019    DFT testing for single ICD ( 09/08/2022 01:48 PM    ALT 8 09/08/2022 01:48 PM       POC Tests: No results for input(s): POCGLU, POCNA, POCK, POCCL, POCBUN, POCHEMO, POCHCT in the last 72 hours. Coags:   Lab Results   Component Value Date/Time    PROTIME 21.2 09/08/2022 01:48 PM    INR 1.8 09/08/2022 01:48 PM    APTT 32.8 09/08/2022 01:48 PM       HCG (If Applicable): No results found for: PREGTESTUR, PREGSERUM, HCG, HCGQUANT     ABGs: No results found for: PHART, PO2ART, EBB0WMY, MLA6VME, BEART, P0FWYVYF     Type & Screen (If Applicable):  No results found for: LABABO, LABRH    Drug/Infectious Status (If Applicable):  No results found for: HIV, HEPCAB    COVID-19 Screening (If Applicable): No results found for: COVID19        Anesthesia Evaluation  Patient summary reviewed and Nursing notes reviewed no history of anesthetic complications:   Airway: Mallampati: II  TM distance: >3 FB   Neck ROM: full  Mouth opening: > = 3 FB   Dental:    (+) edentulous      Pulmonary:normal exam                               Cardiovascular:  Exercise tolerance: poor (<4 METS),   (+) pacemaker:, past MI:, CHF:,       ECG reviewed    Rate: normal  Echocardiogram reviewed    Cleared by cardiology              Neuro/Psych:   (+) headaches:, psychiatric history:            GI/Hepatic/Renal:   (+) GERD:,           Endo/Other:                     Abdominal:             Vascular: Other Findings:           Anesthesia Plan      general     ASA 4       Induction: intravenous. arterial line, central line and CVP  MIPS: Prophylactic antiemetics administered. Anesthetic plan and risks discussed with patient. Plan discussed with CRNA.     Attending anesthesiologist reviewed and agrees with Preprocedure content                Ghulam Ashley DO   9/13/2022

## 2022-09-13 NOTE — BRIEF OP NOTE
Brief Postoperative Note      Patient: Danii Mcbride  YOB: 1945  MRN: [id number]    Date of Procedure: 9/13/2022    Pre-Op Diagnosis: Chronic systolic heart failure ([address] [I50.22]  Nonischemic cardiomyopathy ([address] [I42.8]  Complete heart block ([address] [I44.2]    Post-Op Diagnosis: Same       Procedure(s):  L V EPICARDIAL LEAD PLACEMENT AND LEFT MINI THORACOTOMY - MEDTRONIC    Surgeon(s):  Eron Pascual MD    Assistant:  First Assistant: Willy Laughlin RN  Resident: Yuliya Leong DO    Anesthesia: General    Estimated Blood Loss (mL): 184     Complications: None    Specimens:   * No specimens in log *    Implants:  Implant Name Type Inv.  Item Serial No.  Lot No. LRB No. Used Action   LEAD PACE BPLR 35 CM EPICARD SCREW IN FIX IS-1 [employer] - [id number] Cardiac Lead LEAD PACE BPLR 35 CM EPICARD SCREW IN FIX IS-1 [employer] [id number] [employer]  N/A 1 Implanted   LEAD PACE BPLR 35 CM EPICARD SCREW IN FIX IS-1 [employer] - [id number] Cardiac Lead LEAD PACE BPLR 35 CM EPICARD SCREW IN FIX IS-1 [employer] [id number] [employer]  N/A 1 Implanted         Drains:   Chest Tube Left Pleural 1 (Active)       Urinary Catheter 09/13/22 Qureshi-Temperature (Active)       Findings: placed two lateral LV leads    Electronically signed by Harman King MD on 9/13/2022 at 5:03 PM

## 2022-09-13 NOTE — PROGRESS NOTES
[facility] Cardiothoracic Surgery  Pre-op Note    9/13/2022    Crys Muñoz is scheduled for surgery today. All of the pertinent studies have been reviewed and patient is NPO. I have discussed the procedure with the patient and family, and they have been given opportunity to ask questions. The attendant risks, benefits, and possible outcomes have been discussed with them. They understand and have signed informed consent.       Debo Dillard MD

## 2022-09-14 ENCOUNTER — APPOINTMENT (OUTPATIENT)
Dept: GENERAL RADIOLOGY | Age: 77
DRG: 261 | End: 2022-09-14
Attending: THORACIC SURGERY (CARDIOTHORACIC VASCULAR SURGERY)
Payer: COMMERCIAL

## 2022-09-14 LAB
ANION GAP SERPL CALCULATED.3IONS-SCNC: 12 MMOL/L (ref 9–17)
BUN BLDV-MCNC: 8 MG/DL (ref 8–23)
BUN/CREAT BLD: 11 (ref 9–20)
CALCIUM SERPL-MCNC: 8.9 MG/DL (ref 8.6–10.4)
CHLORIDE BLD-SCNC: 101 MMOL/L (ref 98–107)
CO2: 24 MMOL/L (ref 20–31)
CREAT SERPL-MCNC: 0.71 MG/DL (ref 0.5–0.9)
GFR AFRICAN AMERICAN: >60 ML/MIN
GFR NON-AFRICAN AMERICAN: >60 ML/MIN
GFR SERPL CREATININE-BSD FRML MDRD: ABNORMAL ML/MIN/{1.73_M2}
GLUCOSE BLD-MCNC: 130 MG/DL (ref 70–99)
HCT VFR BLD CALC: 39.6 % (ref 36.3–47.1)
HEMOGLOBIN: 12.8 G/DL (ref 11.9–15.1)
MCH RBC QN AUTO: 29.2 PG (ref 25.2–33.5)
MCHC RBC AUTO-ENTMCNC: 32.3 G/DL (ref 28.4–34.8)
MCV RBC AUTO: 90.4 FL (ref 82.6–102.9)
NRBC AUTOMATED: 0 PER 100 WBC
PDW BLD-RTO: 14.7 % (ref 11.8–14.4)
PLATELET # BLD: 235 K/UL (ref 138–453)
PMV BLD AUTO: 11.6 FL (ref 8.1–13.5)
POTASSIUM SERPL-SCNC: 4 MMOL/L (ref 3.7–5.3)
RBC # BLD: 4.38 M/UL (ref 3.95–5.11)
SODIUM BLD-SCNC: 137 MMOL/L (ref 135–144)
WBC # BLD: 15.6 K/UL (ref 3.5–11.3)

## 2022-09-14 PROCEDURE — 6370000000 HC RX 637 (ALT 250 FOR IP): Performed by: NURSE PRACTITIONER

## 2022-09-14 PROCEDURE — 2580000003 HC RX 258: Performed by: NURSE PRACTITIONER

## 2022-09-14 PROCEDURE — 6360000002 HC RX W HCPCS: Performed by: NURSE PRACTITIONER

## 2022-09-14 PROCEDURE — 85027 COMPLETE CBC AUTOMATED: CPT

## 2022-09-14 PROCEDURE — 71045 X-RAY EXAM CHEST 1 VIEW: CPT

## 2022-09-14 PROCEDURE — 80048 BASIC METABOLIC PNL TOTAL CA: CPT

## 2022-09-14 PROCEDURE — 94761 N-INVAS EAR/PLS OXIMETRY MLT: CPT

## 2022-09-14 PROCEDURE — 2700000000 HC OXYGEN THERAPY PER DAY

## 2022-09-14 PROCEDURE — 36415 COLL VENOUS BLD VENIPUNCTURE: CPT

## 2022-09-14 RX ORDER — TRAZODONE HYDROCHLORIDE 50 MG/1
50 TABLET ORAL NIGHTLY
Status: DISCONTINUED | OUTPATIENT
Start: 2022-09-14 | End: 2022-09-16 | Stop reason: HOSPADM

## 2022-09-14 RX ORDER — BENZTROPINE MESYLATE 0.5 MG/1
0.5 TABLET ORAL DAILY
Status: DISCONTINUED | OUTPATIENT
Start: 2022-09-15 | End: 2022-09-16 | Stop reason: HOSPADM

## 2022-09-14 RX ADMIN — SODIUM CHLORIDE, PRESERVATIVE FREE 10 ML: 5 INJECTION INTRAVENOUS at 09:12

## 2022-09-14 RX ADMIN — LEVOTHYROXINE SODIUM 25 MCG: 0.03 TABLET ORAL at 09:11

## 2022-09-14 RX ADMIN — DIGOXIN 125 MCG: 125 TABLET ORAL at 09:11

## 2022-09-14 RX ADMIN — MORPHINE SULFATE 2 MG: 2 INJECTION, SOLUTION INTRAMUSCULAR; INTRAVENOUS at 04:22

## 2022-09-14 RX ADMIN — OXYCODONE 10 MG: 5 TABLET ORAL at 05:22

## 2022-09-14 RX ADMIN — OXYCODONE 5 MG: 5 TABLET ORAL at 18:23

## 2022-09-14 RX ADMIN — POTASSIUM CHLORIDE 30 MEQ: 10 CAPSULE, COATED, EXTENDED RELEASE ORAL at 09:10

## 2022-09-14 RX ADMIN — Medication 12.5 MG: at 09:11

## 2022-09-14 RX ADMIN — FAMOTIDINE 20 MG: 20 TABLET, FILM COATED ORAL at 09:10

## 2022-09-14 RX ADMIN — OXYCODONE 10 MG: 5 TABLET ORAL at 01:08

## 2022-09-14 RX ADMIN — SODIUM CHLORIDE, PRESERVATIVE FREE 10 ML: 5 INJECTION INTRAVENOUS at 21:12

## 2022-09-14 RX ADMIN — FAMOTIDINE 20 MG: 20 TABLET, FILM COATED ORAL at 21:12

## 2022-09-14 RX ADMIN — BUMETANIDE 1 MG: 1 TABLET ORAL at 09:10

## 2022-09-14 ASSESSMENT — PAIN - FUNCTIONAL ASSESSMENT
PAIN_FUNCTIONAL_ASSESSMENT: ACTIVITIES ARE NOT PREVENTED
PAIN_FUNCTIONAL_ASSESSMENT: PREVENTS OR INTERFERES SOME ACTIVE ACTIVITIES AND ADLS
PAIN_FUNCTIONAL_ASSESSMENT: ACTIVITIES ARE NOT PREVENTED

## 2022-09-14 ASSESSMENT — PAIN DESCRIPTION - DESCRIPTORS
DESCRIPTORS: ACHING;DISCOMFORT
DESCRIPTORS: ACHING
DESCRIPTORS: ACHING

## 2022-09-14 ASSESSMENT — PAIN DESCRIPTION - ORIENTATION
ORIENTATION: LEFT

## 2022-09-14 ASSESSMENT — PAIN DESCRIPTION - LOCATION
LOCATION: CHEST
LOCATION: RIB CAGE
LOCATION: INCISION

## 2022-09-14 ASSESSMENT — PAIN SCALES - GENERAL
PAINLEVEL_OUTOF10: 8
PAINLEVEL_OUTOF10: 6
PAINLEVEL_OUTOF10: 9
PAINLEVEL_OUTOF10: 8

## 2022-09-14 NOTE — PROGRESS NOTES
Patient arrived to room 2030 via bed with PACU nurses. Patient A&Ox4 on 2L NC. NS infusing, but d/cd d/t EF of 10%. Left chest tube hooked up to suction, with sanguineous output. Chamber marked at 80ml. Vital signs all stable. V paced. Given 2mg of morphine for post-op incisional pain. Post op consults to crit care/pulm and cardiology completed. New orders placed. Patient oriented to room and provided call light. Plan of care reviewed. All questions answered.

## 2022-09-14 NOTE — ANESTHESIA PROCEDURE NOTES
Arterial Line:    An arterial line was placed in the pre-op for the following indication(s): continuous blood pressure monitoring. A 20 gauge (size), 1 and 3/4 inch (length), Arrow (type) catheter was placed, Seldinger technique used, into the left radial artery, secured by tape and Tegaderm. Anesthesia type: Local  Local infiltration: Injection    Events:  patient tolerated procedure well with no complications. 9/13/2022 1:00 PM9/13/2022 1:05 PM  Anesthesiologist: Nazia Alcaraz DO  Performed: Anesthesiologist   Preanesthetic Checklist  Completed: patient identified, IV checked, site marked, risks and benefits discussed, surgical/procedural consents, equipment checked, pre-op evaluation, timeout performed, anesthesia consent given, oxygen available, monitors applied/VS acknowledged, fire risk safety assessment completed and verbalized and blood product R/B/A discussed and consented

## 2022-09-14 NOTE — CONSULTS
[address]  [phone]               Cardiology Consult           Date of Admission:  9/13/2022  Date of Consultation:  9/14/2022      PCP:  Song Castro MD      Chief Complaint:   Status post LV lead placement    History of Present Illness:  Radha Bianchi is a 78 y.o. female who presents with  with a past medical history significant for chronic systolic heart failure nonischemic type. Heart failure with reduced ejection fraction status post transvenous ICD with Bi V capacity with failed attempt at LV lead she was therefore referred to Cardiothoracic surgery for LV epicardial lead placement which was accomplished yesterday. She is resting comfortably in no apparent distress. Check of her device this morning shows a normally functioning device. PMH:   has a past medical history of Anxiety, Atrial fibrillation (HCC), Chronic systolic heart failure ([address] Esophageal reflux, Gastric perforation ([address] Headache, ICD (implantable cardioverter-defibrillator) in place, MI (myocardial infarction) ([address] Nonischemic cardiomyopathy ([address] Obesity, Recurrent major depressive disorder ([address] and Thyroid disease. PSH:   has a past surgical history that includes Cholecystectomy; Cataract extraction (Bilateral); Tonsillectomy; Leg Surgery (1984); Ankle surgery; Cardiac defibrillator placement (03/10/2022); Cardiac electrophysiology study & DFT (03/04/2019); Cardiac catheterization; eye surgery (Bilateral); and Cardiac surgery (N/A, 9/13/2022). Allergies: Allergies   Allergen Reactions    Penicillins Hives        Home Meds:    Prior to Admission medications    Medication Sig Start Date End Date Taking?  Authorizing Provider   levothyroxine (SYNTHROID) 25 MCG tablet Take 25 mcg by mouth daily 4/21/22   Historical Provider, MD   benztropine (COGENTIN) 0.5 MG tablet TAKE 1 TABLET BY MOUTH ONCE DAILY 3/17/22   Historical Provider, MD bumetanide (BUMEX) 1 MG tablet Take 1 mg by mouth daily 3/11/22   Historical Provider, MD   digoxin (LANOXIN) 125 MCG tablet Take 125 mcg by mouth daily 3/30/22   Historical Provider, MD   metoprolol succinate (TOPROL XL) 25 MG extended release tablet Take 12.5 mg by mouth daily 12/29/21   Historical Provider, MD   nitroGLYCERIN (NITROSTAT) 0.4 MG SL tablet Place 0.4 mg under the tongue every 5 minutes as needed  Patient not taking: Reported on 9/13/2022 1/29/19   Historical Provider, MD   potassium chloride (KLOR-CON M) 10 MEQ extended release tablet Take 30 mEq by mouth daily 12/29/21   Historical Provider, MD   rivaroxaban (XARELTO) 20 MG TABS tablet Take 20 mg by mouth daily 12/29/21   Historical Provider, MD   spironolactone (ALDACTONE) 25 MG tablet Take 12.5 mg by mouth daily 4/12/22   Historical Provider, MD   traZODone (DESYREL) 50 MG tablet TAKE 1 TABLET BY MOUTH ONCE DAILY AT NIGHT 12/16/21   Historical Provider, MD   trifluoperazine (STELAZINE) 1 MG tablet Take 1 mg by mouth 2 times daily 12/16/21   Historical Provider, MD        Hospital Meds:    Current Facility-Administered Medications   Medication Dose Route Frequency Provider Last Rate Last Admin    rivaroxaban (XARELTO) tablet 20 mg  20 mg Oral Daily Deniz Duke MD        [START ON 9/15/2022] benztropine (COGENTIN) tablet 0.5 mg  0.5 mg Oral Daily Deniz Duke MD        traZODone (DESYREL) tablet 50 mg  50 mg Oral Nightly Deniz Duke MD        levothyroxine (SYNTHROID) tablet 25 mcg  25 mcg Oral Daily SATURNINO Diaz - NP   25 mcg at 09/14/22 0911    digoxin (LANOXIN) tablet 125 mcg  125 mcg Oral Daily SATURNINO Diaz - NP   125 mcg at 09/14/22 0911    bumetanide (BUMEX) tablet 1 mg  1 mg Oral Daily SATURNINO Diaz - NP   1 mg at 09/14/22 0910    metoprolol succinate (TOPROL XL) split-tablet 12.5 mg  12.5 mg Oral Daily SATURNINO Diaz NP   12.5 mg at 09/14/22 0911    potassium chloride (MICRO-K) extended release capsule 30 mEq  30 mEq Oral Daily Nuala Hoist, APRN - NP   30 mEq at 09/14/22 0910    spironolactone (ALDACTONE) pre-split tablet 12.5 mg  12.5 mg Oral Daily Nuala Hoist, APRN - NP   12.5 mg at 09/14/22 0911    sodium chloride flush 0.9 % injection 5-40 mL  5-40 mL IntraVENous 2 times per day Nuala Hoist, APRN - NP   10 mL at 09/14/22 0912    sodium chloride flush 0.9 % injection 5-40 mL  5-40 mL IntraVENous PRN Nuala Hoist, APRN - NP        0.9 % sodium chloride infusion   IntraVENous PRN Nuala Hoist, APRN - NP        famotidine (PEPCID) tablet 20 mg  20 mg Oral BID Nuala Hoist, APRN - NP   20 mg at 09/14/22 0910    Or    famotidine (PEPCID) 20 mg in sodium chloride (PF) 10 mL injection  20 mg IntraVENous BID Nuala Hoist, APRN - NP        oxyCODONE (ROXICODONE) immediate release tablet 5 mg  5 mg Oral Q4H PRN Nuala Hoist, APRN - NP        Or    oxyCODONE (ROXICODONE) immediate release tablet 10 mg  10 mg Oral Q4H PRN Nuala Hoist, APRN - NP   10 mg at 09/14/22 0522    morphine (PF) injection 2 mg  2 mg IntraVENous Q3H PRN Nuala Hoist, APRN - NP   2 mg at 09/14/22 0422    ondansetron (ZOFRAN-ODT) disintegrating tablet 4 mg  4 mg Oral Q8H PRN Nuala Hoist, APRN - NP        Or    ondansetron [facility]) injection 4 mg  4 mg IntraVENous Q6H PRN Nuala Hoist, APRN - NP        magnesium hydroxide (MILK OF MAGNESIA) 400 MG/5ML suspension 30 mL  30 mL Oral Daily PRN Nuala Hoist, APRN - NP        bisacodyl (DULCOLAX) EC tablet 5 mg  5 mg Oral Daily PRN Nuala Hoist, APRN - NP           Social History:       TOBACCO:   reports that she has never smoked. She has never used smokeless tobacco.  ETOH:   reports that she does not currently use alcohol. DRUGS:  reports no history of drug use. OCCUPATION:          Family Histroy:     History reviewed. No pertinent family history.         Review of Systems:   Constitutional: there has been no unanticipated weight loss. There's been no change in energy level, sleep pattern, or activity level. Eyes: No visual changes or diplopia. No scleral icterus. ENT: No Headaches, hearing loss or vertigo. No mouth sores or sore throat. Cardiovascular: No chest pain, dyspnea on exertion, palpitations or loss of consciousness. No cough, hemoptysis, pleuritic pain, or phlebitis. Respiratory: No cough or wheezing, no sputum production. No hematemesis. Gastrointestinal: No abdominal pain, appetite loss, blood in stools. No change in bowel or bladder habits. Genitourinary: No dysuria, trouble voiding, or hematuria. Musculoskeletal:  No gait disturbance, weakness or joint complaints. Integumentary: No rash or pruritis. Neurological: No headache, diplopia, change in muscle strength, numbness or tingling. No change in gait, balance, coordination, mood, affect, memory, mentation, behavior. Psychiatric: No anxiety, or depression. Endocrine: No temperature intolerance. No excessive thirst, fluid intake, or urination. No tremor. Hematologic/Lymphatic: No abnormal bruising or bleeding, blood clots or swollen lymph nodes. Allergic/Immunologic: No nasal congestion or hives. Physical Exam    Vital Signs: /75   Pulse 81   Temp 98.6 °F (37 °C) (Temporal)   Resp 10   Ht 5' 3\" (1.6 m)   Wt 187 lb (84.8 kg)   SpO2 98%   BMI 33.13 kg/m²  O2 Flow Rate (L/min): 2 L/min     Admission Weight: 187 lb 13.3 oz (85.2 kg)     General appearance: Awake, Alert Cooperative    Head: Normocephalic, without obvious abnormality, atraumatic    Eyes: Conjunctivae/corneas clear. PERRL, EOM's intact. Fundi benign    Neck: no adenopathy, no carotid bruit, no JVD, supple, symmetrical, trachea midline and thyroid: not enlarged, symmetric, no tenderness/mass/nodules    Lungs: clear to auscultation bilaterally    Heart: regular rate and rhythm, S1, S2 normal, no murmur, click, rub or gallop    Abdomen: Soft, non-tender.  Bowel sounds normal. No masses,  no organomegaly    Extremities: extremities normal, atraumatic, no cyanosis or edema    Skin: Skin color, texture, turgor normal. No rashes or lesions    Neurologic: Grossly normal            Labs:      CBC:   Recent Labs     09/14/22 0315   WBC 15.6*   HGB 12.8   HCT 39.6   MCV 90.4        BMP:   Recent Labs     09/14/22 0315      K 4.0      CO2 24   BUN 8   CREATININE 0.71     PT/INR: No results for input(s): PROTIME, INR in the last 72 hours. APTT: No results for input(s): APTT in the last 72 hours. MAG: No results for input(s): MG in the last 72 hours. D Dimer: No results for input(s): DDIMER in the last 72 hours. Troponin T No results for input(s): TROPHS in the last 72 hours. ProBNP Invalid input(s): PRO-BNP    XR CHEST (2 VW)    Result Date: 9/8/2022  Chronic findings. No acute process is identified. XR CHEST PORTABLE    Result Date: 9/14/2022  Stable cardiomegaly. No focal consolidation. XR CHEST PORTABLE    Result Date: 9/13/2022  More prominent cardiac silhouette enlargement and opacification in the right lung base for which atelectasis and/or effusion may be present. Diagnosis:  Active Problems:    Longstanding persistent atrial fibrillation (HCC)  Resolved Problems:    * No resolved hospital problems. *          Plan:      Longstanding persistent atrial fibrillation. Previously on Xarelto for stroke prophylaxis. Will need to resume anticoagulation when safe to do so from a surgical standpoint. I will need input from surgery service as to when to do so. Will also discuss case with our electrophysiologist regarding any future procedures needed. Will continue to follow closely with you. If any questions do not hesitate to contact our service.       Electronically signed by Karina Cotton MD on 9/14/2022 at 4:20 PM

## 2022-09-14 NOTE — ANESTHESIA POSTPROCEDURE EVALUATION
Department of Anesthesiology  Postprocedure Note    Patient: Karli Sandoval  MRN: [id number]  YOB: 1945  Date of evaluation: 09/13/22      Procedure Summary     Date: 09/13/22 Room / Location: [address] - INPATIENT    Anesthesia Start: 2252 Anesthesia Stop: 9897    Procedure: L V EPICARDIAL LEAD PLACEMENT AND LEFT MINI THORACOTOMY - [employer]TR[employer] Diagnosis:       Chronic systolic heart failure ([address]      Nonischemic cardiomyopathy ([address]      Complete heart block ([address]      (Chronic systolic heart failure ([address] [I50.22])      (Nonischemic cardiomyopathy ([address] [I42.8])      (Complete heart block ([address] [I44.2])    Surgeons: Edson Orozco MD Responsible Provider: Marimar Agrawal DO    Anesthesia Type: general ASA Status: 4          Anesthesia Type: No value filed.     Janki Phase I: Janki Score: 8    Janki Phase II:        Anesthesia Post Evaluation    Patient location during evaluation: PACU  Patient participation: complete - patient participated  Level of consciousness: awake and alert  Airway patency: patent  Nausea & Vomiting: no nausea and no vomiting  Complications: no  Cardiovascular status: hemodynamically stable  Respiratory status: acceptable  Hydration status: stable

## 2022-09-14 NOTE — ACP (ADVANCE CARE PLANNING)
Advance Care Planning     Advance Care Planning Activator (Inpatient)  Conversation Note      Date of ACP Conversation: 9/14/2022     Conversation Conducted with: Patient with Decision Making Capacity    ACP Activator: Minnie Khan RN      Health Care Decision Maker:     [facility] Decision Maker:    Son Lc Freed   [phone] and Daughter Néstor Og   [phone]  Care Preferences    Ventilation: \"If you were in your present state of health and suddenly became very ill and were unable to breathe on your own, what would your preference be about the use of a ventilator (breathing machine) if it were available to you? \"      Would the patient desire the use of ventilator (breathing machine)?: yes    \"If your health worsens and it becomes clear that your chance of recovery is unlikely, what would your preference be about the use of a ventilator (breathing machine) if it were available to you? \"     Would the patient desire the use of ventilator (breathing machine)?: Yes      Resuscitation  \"CPR works best to restart the heart when there is a sudden event, like a heart attack, in someone who is otherwise healthy. Unfortunately, CPR does not typically restart the heart for people who have serious health conditions or who are very sick. \"    \"In the event your heart stopped as a result of an underlying serious health condition, would you want attempts to be made to restart your heart (answer \"yes\" for attempt to resuscitate) or would you prefer a natural death (answer \"no\" for do not attempt to resuscitate)? \" yes       [] Yes   [] No   Educated Patient / Chuck Mccloud regarding differences between Advance Directives and portable DNR orders.     Length of ACP Conversation in minutes:  5    Conversation Outcomes:  [x] ACP discussion completed  [] Existing advance directive reviewed with patient; no changes to patient's previously recorded wishes  [] New Advance Directive completed  [] Portable Do Not Rescitate prepared for Provider review and signature  [] POLST/POST/MOLST/MOST prepared for Provider review and signature

## 2022-09-14 NOTE — ANESTHESIA PROCEDURE NOTES
Central Venous Line:    A central venous line was placed using ultrasound guidance, in the OR for the following indication(s): central venous access. 9/13/2022 2:15 PM9/13/2022 2:30 PM    Sterility preparation included the following: hand hygiene performed prior to procedure, maximum sterile barriers used and sterile technique used to drape from head to toe. The patient was placed in Trendelenburg position. The right internal jugular vein was prepped. The site was prepped with Chloraprep. A 7 Fr (size), 16 (length), triple lumen was placed. During the procedure, the following specific steps were taken: target vein identified, needle advanced into vein and blood aspirated and guidewire advanced into vein. Intravenous verification was obtained by ultrasound. Post insertion care included: all ports aspirated, all ports flushed easily, guidewire removed intact, Biopatch applied, line sutured in place and dressing applied. During the procedure the patient experienced: patient tolerated procedure well with no complications.       Insertion site scrubbed per usage guidelines?: Yes  Skin prep agent dried for 3 minutes prior to procedure?:yes  Outcomes: uncomplicated  Real-time US image taken/store: yes  Anesthesia type: general..No  Staffing  Performed: Anesthesiologist   Anesthesiologist: Carol Abdi,   Preanesthetic Checklist  Completed: patient identified, IV checked, site marked, risks and benefits discussed, surgical/procedural consents, equipment checked, pre-op evaluation, timeout performed, anesthesia consent given, oxygen available, monitors applied/VS acknowledged, fire risk safety assessment completed and verbalized and blood product R/B/A discussed and consented

## 2022-09-14 NOTE — CONSULTS
Pulmonary Medicine and [facility] APRN-CNP/Nhung Galindo MD      Patient - Radha Prince   MRN -  [id number]   Acct # - [de-identified]   - 1945      Date of Admission -  2022 12:17 PM  Date of evaluation -  2022  Room -    Praful Patterson MD Primary Care Physician - Mert Painter MD     Reason for Consult    Status postthoracotomy    Assessment   Acute respiratory insufficiency  Status post minithoracotomy for epicardial lead pacemaker placement  Suspected obstructive sleep apnea/Obesity  A. fib, cardiomyopathy/CHF, GERD, thyroid disease    Recommendations   Oxygen via nasal cannula, wean as able, keep SPO2 90% or greater   Incentive spirometry every hour while awake   Chest tube management per CT   X-ray chest in am  Labs: CBC and BMP in am  DVT prophylaxis with Xarelto  Outpatient sleep apnea evaluation  Discussed with RN  Above assessment and plan will be reviewed with Dr. Roderick Galindo. Patient plan will be finalized following review by Dr. Roderick Galindo. Will follow with you    Problem List      Patient Active Problem List   Diagnosis    Longstanding persistent atrial fibrillation (HCC)       HPI     Radha Prince is 78 y.o.,  female admitted yesterday for minithoracotomy for epicardial lead pacemaker placement. She is currently resting comfortably in bed, no distress. She is on oxygen at 2 L nasal cannula. She has left chest tube in place, no airleak noted. She has left-sided chest discomfort with coughing. She denies significant shortness of breath. She does not wear oxygen at home. She has never been checked for sleep apnea.   PMHx   Past Medical History      Diagnosis Date    Anxiety     Atrial fibrillation ([address]     Chronic systolic heart failure ([address] 2016    Esophageal reflux     Gastric perforation (HCC)     Headache     ICD (implantable cardioverter-defibrillator) in place     MI (myocardial infarction) ([address] Nonischemic cardiomyopathy (HCC)     Obesity     Recurrent major depressive disorder ([facility]     Thyroid disease       Past Surgical History        Procedure Laterality Date    ANKLE SURGERY      CARDIAC CATHETERIZATION      CARDIAC DEFIBRILLATOR PLACEMENT  03/10/2022    BiV ICD Implant (Dr. Charline Sutton - [facility])    CARDIAC ELECTROPHYSIOLOGY STUDY & DFT  03/04/2019    DFT testing for single ICD (Dr. Lawrence Anna - [facility])    CARDIAC SURGERY N/A 9/13/2022    L V EPICARDIAL LEAD PLACEMENT AND LEFT MINI THORACOTOMY - [employer]TR[employer]IC performed by Adebayo Macias MD at [address] [place] [address] Bilateral     CHOLECYSTECTOMY      EYE SURGERY Bilateral     Cataract    LEG SURGERY  1984    TONSILLECTOMY         Meds    Current Medications    rivaroxaban  20 mg Oral Daily    [START ON 9/15/2022] benztropine  0.5 mg Oral Daily    traZODone  50 mg Oral Nightly    levothyroxine  25 mcg Oral Daily    digoxin  125 mcg Oral Daily    bumetanide  1 mg Oral Daily    metoprolol succinate  12.5 mg Oral Daily    potassium chloride  30 mEq Oral Daily    spironolactone  12.5 mg Oral Daily    sodium chloride flush  5-40 mL IntraVENous 2 times per day    famotidine  20 mg Oral BID    Or    famotidine (PEPCID) injection  20 mg IntraVENous BID     sodium chloride flush, sodium chloride, oxyCODONE **OR** oxyCODONE, morphine, ondansetron **OR** ondansetron, magnesium hydroxide, bisacodyl  IV Drips/Infusions   sodium chloride       Home Medications  Medications Prior to Admission: levothyroxine (SYNTHROID) 25 MCG tablet, Take 25 mcg by mouth daily  benztropine (COGENTIN) 0.5 MG tablet, TAKE 1 TABLET BY MOUTH ONCE DAILY  bumetanide (BUMEX) 1 MG tablet, Take 1 mg by mouth daily  digoxin (LANOXIN) 125 MCG tablet, Take 125 mcg by mouth daily  metoprolol succinate (TOPROL XL) 25 MG extended release tablet, Take 12.5 mg by mouth daily  nitroGLYCERIN (NITROSTAT) 0.4 MG SL tablet, Place 0.4 mg under the tongue every 5 minutes as needed (Patient not taking: Reported on 9/13/2022)  potassium chloride (KLOR-CON M) 10 MEQ extended release tablet, Take 30 mEq by mouth daily  rivaroxaban (XARELTO) 20 MG TABS tablet, Take 20 mg by mouth daily  spironolactone (ALDACTONE) 25 MG tablet, Take 12.5 mg by mouth daily  traZODone (DESYREL) 50 MG tablet, TAKE 1 TABLET BY MOUTH ONCE DAILY AT NIGHT  trifluoperazine (STELAZINE) 1 MG tablet, Take 1 mg by mouth 2 times daily    Allergies    Penicillins  Social History     Social History     Tobacco Use    Smoking status: Never    Smokeless tobacco: Never   Substance Use Topics    Alcohol use: Not Currently     Family History    History reviewed. No pertinent family history. ROS - 11 systems   General Denies any fever or chills  HEENT Denies any diplopia, tinnitus or vertigo  Resp Denies any shortness of breath, cough or wheezing  Cardiac Denies any chest pain, palpitations, claudication or edema  GI Denies any melena, hematochezia, hematemesis or pyrosis   Denies any frequency, urgency, hesitancy or incontinence  Heme Denies bruising or bleeding easily  Endocrine Denies any history of diabetes   Neuro Denies any focal motor or sensory deficits  Psychiatric Denies anxiety, depression, suicidal ideation  Skin Denies rashes, itching, open sores    Vitals     height is 5' 3\" (1.6 m) and weight is 187 lb (84.8 kg). Her temporal temperature is 98.6 °F (37 °C). Her blood pressure is 115/73 and her pulse is 92. Her respiration is 13 and oxygen saturation is 93%. Body mass index is 33.13 kg/m². I/O      Intake/Output Summary (Last 24 hours) at 9/14/2022 1538  Last data filed at 9/14/2022 1000  Gross per 24 hour   Intake 1065 ml   Output 2272 ml   Net -1207 ml     I/O last 3 completed shifts:   In: 5118 [I.V.:1015; IV Piggyback:50]  Out: 1583 [Urine:1380; Blood:150; Chest Tube:142]   Patient Vitals for the past 96 hrs (Last 3 readings):   Weight   09/14/22 0400 187 lb (84.8 kg)   09/13/22 1241 187 lb 13.3 oz (85.2 kg)     Exam   General Appearance Awake, alert, oriented, in no acute distress  HEENT - Head is normocephalic, atraumatic. Pupil reactive to light  Neck - Supple,  trachea midline and straight  Lungs -moderate air exchange, no crackles or wheezing  Cardiovascular - Heart sounds are normal.  Regular rhythm normal rate without murmur, gallop or rub. Abdomen - Soft, nontender, nondistended, no masses or organomegaly  Neurologic - CN II-XII are grossly intact.  There are no focal motor or sensory deficits  Skin - No bruising or bleeding  Extremities - No cyanosis, clubbing or edema    Labs  - Old records and notes have been reviewed in [facility]   CBC     Lab Results   Component Value Date/Time    WBC 15.6 09/14/2022 03:15 AM    RBC 4.38 09/14/2022 03:15 AM    HGB 12.8 09/14/2022 03:15 AM    HCT 39.6 09/14/2022 03:15 AM     09/14/2022 03:15 AM    MCV 90.4 09/14/2022 03:15 AM    MCH 29.2 09/14/2022 03:15 AM    MCHC 32.3 09/14/2022 03:15 AM    RDW 14.7 09/14/2022 03:15 AM    LYMPHOPCT 19 09/08/2022 01:48 PM    MONOPCT 7 09/08/2022 01:48 PM    BASOPCT 1 09/08/2022 01:48 PM    MONOSABS 0.48 09/08/2022 01:48 PM    LYMPHSABS 1.36 09/08/2022 01:48 PM    EOSABS 0.19 09/08/2022 01:48 PM    BASOSABS 0.04 09/08/2022 01:48 PM     BMP   Lab Results   Component Value Date/Time     09/14/2022 03:15 AM    K 4.0 09/14/2022 03:15 AM     09/14/2022 03:15 AM    CO2 24 09/14/2022 03:15 AM    BUN 8 09/14/2022 03:15 AM    CREATININE 0.71 09/14/2022 03:15 AM    GLUCOSE 130 09/14/2022 03:15 AM    CALCIUM 8.9 09/14/2022 03:15 AM     LFTS  Lab Results   Component Value Date/Time    ALKPHOS 99 09/08/2022 01:48 PM    ALT 8 09/08/2022 01:48 PM    AST 18 09/08/2022 01:48 PM    PROT 6.7 09/08/2022 01:48 PM    BILITOT 0.8 09/08/2022 01:48 PM    LABALBU 3.7 09/08/2022 01:48 PM     PTT  Lab Results   Component Value Date    APTT 32.8 09/08/2022     INR   Lab Results   Component Value Date    INR 1.8 09/08/2022    PROTIME 21.2 (H) 09/08/2022       Radiology    CXR

## 2022-09-14 NOTE — PROGRESS NOTES
End Of Shift Note  [facility] CVICU  Summary of shift: Patient arrived from PACU at 200. Pain meds given throughout night for post-op incisional pain. Vitals have remained stable. V paced. No IV gtts. Dr. Elysa Felty updated on patient's progress at 2145. Diet advanced. Chest tube output 48 ml since arrival to the unit. Qureshi removed. Febrile at 100. 6F with WBC at 15.6 this morning. Urine also increasingly cloudy throughout the night.        Vitals:    Vitals:    09/14/22 0400 09/14/22 0500 09/14/22 0552 09/14/22 0600   BP: 118/73 135/86  116/65   Pulse: 76 74  79   Resp: 26 20 20 17   Temp: (!) 100.6 °F (38.1 °C)   99.6 °F (37.6 °C)   TempSrc: Bladder   Axillary   SpO2: 96% 94%  94%   Weight: 187 lb (84.8 kg)      Height:            I&O:   Intake/Output Summary (Last 24 hours) at 9/14/2022 0624  Last data filed at 9/14/2022 0600  Gross per 24 hour   Intake 1065 ml   Output 1660 ml   Net -595 ml       Resp Status: 2L NC    Ventilator Settings:     / / /     Critical Care IV infusions:   sodium chloride          LDA:   CVC Triple Lumen 09/13/22 Right Internal jugular (Active)   Number of days: 1       Peripheral IV 09/13/22 Right;Posterior Hand (Active)   Number of days: 0       Peripheral IV 09/13/22 Right Forearm (Active)   Number of days: 0       Chest Tube Left Pleural 1 (Active)   Number of days: 1       Incision 09/13/22 Chest Lateral;Left;Lower (Active)   Number of days: 1       Incision Chest Left;Upper (Active)   Number of days:

## 2022-09-14 NOTE — PROGRESS NOTES
[facility] Cardiothoracic Surgical   Progress Note    9/14/2022 3:28 PM  Surgeon:  Dimple Carter         POD# 1    S/P :  epicardial lead placement    Subjective:  Ms. Saturnino Concepcion  some pain tired didn't sleep well    Vital Signs: /73   Pulse 92   Temp 98.6 °F (37 °C) (Temporal)   Resp 13   Ht 5' 3\" (1.6 m)   Wt 187 lb (84.8 kg)   SpO2 93%   BMI 33.13 kg/m²  O2 Flow Rate (L/min): 2 L/min   Admit Weight: Weight: 187 lb 13.3 oz (85.2 kg)     Labs and Studies:  CBC:   Recent Labs     09/14/22 0315   WBC 15.6*   HGB 12.8   HCT 39.6   MCV 90.4        BMP:   Recent Labs     09/14/22 0315      K 4.0      CO2 24   BUN 8   CREATININE 0.71     PT/INR: No results for input(s): PROTIME, INR in the last 72 hours. APTT: No results for input(s): APTT in the last 72 hours. I/O:  I/O last 3 completed shifts: In: 7973 [I.V.:1015; IV Piggyback:50]  Out: 1567 [Urine:1380; Blood:150; Chest Tube:142]    Scheduled Meds:    rivaroxaban  20 mg Oral Daily    [START ON 9/15/2022] benztropine  0.5 mg Oral Daily    traZODone  50 mg Oral Nightly    levothyroxine  25 mcg Oral Daily    digoxin  125 mcg Oral Daily    bumetanide  1 mg Oral Daily    metoprolol succinate  12.5 mg Oral Daily    potassium chloride  30 mEq Oral Daily    spironolactone  12.5 mg Oral Daily    sodium chloride flush  5-40 mL IntraVENous 2 times per day    famotidine  20 mg Oral BID    Or    famotidine (PEPCID) injection  20 mg IntraVENous BID     Continuous Infusions:    sodium chloride       Assessment/Plan:   S/P Epicardial Lead pacemaker  Chest tube drainage okay  No air leak. Keep chest tube to suction toniaght  Incision looks okay  BS interrogated PPM and AICD today addison good contact functioning well.     JEFFY Avalos

## 2022-09-14 NOTE — PLAN OF CARE
Problem: Pain  Goal: Verbalizes/displays adequate comfort level or baseline comfort level  9/14/2022 0314 by Yelena Pedro RN  Outcome: Progressing     Problem: Discharge Planning  Goal: Discharge to home or other facility with appropriate resources  Outcome: Progressing     Problem: ABCDS Injury Assessment  Goal: Absence of physical injury  Outcome: Progressing

## 2022-09-14 NOTE — PROGRESS NOTES
Received call this morning from Dr Douglas Bellamy to check on patient. Gave update on patient status and chest tube output. Order received from Dr Douglas Bellamy to resume all of patient's home meds, advance diet as tolerated, keep on O2 as needed, and continue to monitor chest tube output. Will keep chest tubes in place over night if patient continues to have output.

## 2022-09-15 ENCOUNTER — APPOINTMENT (OUTPATIENT)
Dept: GENERAL RADIOLOGY | Age: 77
DRG: 261 | End: 2022-09-15
Attending: THORACIC SURGERY (CARDIOTHORACIC VASCULAR SURGERY)
Payer: COMMERCIAL

## 2022-09-15 PROBLEM — Z53.32: Status: ACTIVE | Noted: 2022-09-15

## 2022-09-15 PROCEDURE — 71045 X-RAY EXAM CHEST 1 VIEW: CPT

## 2022-09-15 PROCEDURE — 6370000000 HC RX 637 (ALT 250 FOR IP): Performed by: NURSE PRACTITIONER

## 2022-09-15 PROCEDURE — 2580000003 HC RX 258: Performed by: NURSE PRACTITIONER

## 2022-09-15 PROCEDURE — 2000000000 HC ICU R&B

## 2022-09-15 PROCEDURE — 99024 POSTOP FOLLOW-UP VISIT: CPT | Performed by: NURSE PRACTITIONER

## 2022-09-15 RX ORDER — ACETAMINOPHEN 500 MG
1000 TABLET ORAL EVERY 8 HOURS PRN
Status: DISCONTINUED | OUTPATIENT
Start: 2022-09-15 | End: 2022-09-16 | Stop reason: HOSPADM

## 2022-09-15 RX ORDER — POTASSIUM CHLORIDE 750 MG/1
30 TABLET, FILM COATED, EXTENDED RELEASE ORAL DAILY
Status: DISCONTINUED | OUTPATIENT
Start: 2022-09-15 | End: 2022-09-16 | Stop reason: HOSPADM

## 2022-09-15 RX ADMIN — LEVOTHYROXINE SODIUM 25 MCG: 0.03 TABLET ORAL at 06:22

## 2022-09-15 RX ADMIN — DIGOXIN 125 MCG: 125 TABLET ORAL at 08:34

## 2022-09-15 RX ADMIN — OXYCODONE 5 MG: 5 TABLET ORAL at 04:48

## 2022-09-15 RX ADMIN — Medication 12.5 MG: at 08:35

## 2022-09-15 RX ADMIN — Medication 12.5 MG: at 08:33

## 2022-09-15 RX ADMIN — ACETAMINOPHEN 1000 MG: 500 TABLET ORAL at 17:57

## 2022-09-15 RX ADMIN — FAMOTIDINE 20 MG: 20 TABLET, FILM COATED ORAL at 20:55

## 2022-09-15 RX ADMIN — SODIUM CHLORIDE, PRESERVATIVE FREE 10 ML: 5 INJECTION INTRAVENOUS at 09:08

## 2022-09-15 RX ADMIN — TRAZODONE HYDROCHLORIDE 50 MG: 50 TABLET ORAL at 20:55

## 2022-09-15 RX ADMIN — POTASSIUM CHLORIDE 30 MEQ: 750 TABLET, FILM COATED, EXTENDED RELEASE ORAL at 09:06

## 2022-09-15 RX ADMIN — BENZTROPINE MESYLATE 0.5 MG: 0.5 TABLET ORAL at 08:30

## 2022-09-15 RX ADMIN — FAMOTIDINE 20 MG: 20 TABLET, FILM COATED ORAL at 08:41

## 2022-09-15 RX ADMIN — SODIUM CHLORIDE, PRESERVATIVE FREE 10 ML: 5 INJECTION INTRAVENOUS at 21:02

## 2022-09-15 RX ADMIN — BUMETANIDE 1 MG: 1 TABLET ORAL at 08:31

## 2022-09-15 ASSESSMENT — PAIN DESCRIPTION - LOCATION
LOCATION: INCISION
LOCATION: CHEST;RIB CAGE

## 2022-09-15 ASSESSMENT — PAIN DESCRIPTION - ORIENTATION
ORIENTATION: LEFT

## 2022-09-15 ASSESSMENT — PAIN SCALES - GENERAL
PAINLEVEL_OUTOF10: 7
PAINLEVEL_OUTOF10: 0
PAINLEVEL_OUTOF10: 3
PAINLEVEL_OUTOF10: 0

## 2022-09-15 ASSESSMENT — PAIN DESCRIPTION - DESCRIPTORS: DESCRIPTORS: ACHING

## 2022-09-15 ASSESSMENT — PAIN DESCRIPTION - PAIN TYPE: TYPE: SURGICAL PAIN

## 2022-09-15 NOTE — PLAN OF CARE
Care Plan Note  Pt resting comfortably. Ambulates with walker with minimal assistance. Problem: Discharge Planning  Goal: Discharge to home or other facility with appropriate resources  Flowsheets (Taken 9/15/2022 0229)  Discharge to home or other facility with appropriate resources:   Identify barriers to discharge with patient and caregiver   Arrange for needed discharge resources and transportation as appropriate   Identify discharge learning needs (meds, wound care, etc)   Arrange for interpreters to assist at discharge as needed   Refer to discharge planning if patient needs post-hospital services based on physician order or complex needs related to functional status, cognitive ability or social support system     Problem: Pain  Goal: Verbalizes/displays adequate comfort level or baseline comfort level  Flowsheets (Taken 9/15/2022 0229)  Verbalizes/displays adequate comfort level or baseline comfort level:   Encourage patient to monitor pain and request assistance   Assess pain using appropriate pain scale   Administer analgesics based on type and severity of pain and evaluate response   Implement non-pharmacological measures as appropriate and evaluate response   Consider cultural and social influences on pain and pain management   Notify Licensed Independent Practitioner if interventions unsuccessful or patient reports new pain     Problem: Skin/Tissue Integrity  Goal: Absence of new skin breakdown  Description: 1. Monitor for areas of redness and/or skin breakdown  2. Assess vascular access sites hourly  3. Every 4-6 hours minimum:  Change oxygen saturation probe site  4. Every 4-6 hours:  If on nasal continuous positive airway pressure, respiratory therapy assess nares and determine need for appliance change or resting period. Note: Pt turning frequently and ambulates multiple times a day. Protective dressings applied to bony prominences.       Problem: Safety - Adult  Goal: Free from fall injury  Flowsheets (Taken 9/15/2022 0229)  Free From Fall Injury:   Instruct family/caregiver on patient safety   Based on caregiver fall risk screen, instruct family/caregiver to ask for assistance with transferring infant if caregiver noted to have fall risk factors     Problem: ABCDS Injury Assessment  Goal: Absence of physical injury  Flowsheets (Taken 9/15/2022 0229)  Absence of Physical Injury: Implement safety measures based on patient assessment

## 2022-09-15 NOTE — PROGRESS NOTES
Pulmonary Critical Care Progress Note  Monalisa Phoenix, MD     Patient seen for the follow up of acute respiratory insufficiency, suspected JERAMY/obesity, status post minithoracotomy for pacemaker placement    Subjective:  Reviewed overnight events noted. She is resting comfortably in bed no distress. She had around 100 mL out of her chest tube last night. CT surgery is possibly going to remove later on today. She just returned from ambulating to the bathroom with assistance and did not have any significant shortness of breath. She has a productive cough with clear sputum which is chronic for her. She denies any pain other than chest tube/incision pain. Examination:  Vitals: /71   Pulse 89   Temp 97 °F (36.1 °C) (Temporal)   Resp 10   Ht 5' 3\" (1.6 m)   Wt 182 lb 14.4 oz (83 kg)   SpO2 98%   BMI 32.40 kg/m²   General appearance: alert and cooperative with exam  Neck: No JVD  Lungs: Moderate air exchange, no crackles or wheezing  Heart: regular rate and rhythm, S1, S2 normal, no gallop  Abdomen: Soft, non tender, + BS  Extremities: no cyanosis or clubbing.  No significant edema    LABs:  CBC:   Recent Labs     09/14/22  0315   WBC 15.6*   HGB 12.8   HCT 39.6        BMP:   Recent Labs     09/14/22  0315      K 4.0   CO2 24   BUN 8   CREATININE 0.71   LABGLOM >60   GLUCOSE 130*       ABG:  Lab Results   Component Value Date/Time    FIO2 2.0 09/13/2022 06:13 PM       Lab Results   Component Value Date/Time    POCPH 7.335 09/13/2022 06:13 PM    POCPCO2 45.7 09/13/2022 06:13 PM    POCPO2 69.4 09/13/2022 06:13 PM    POCHCO3 24.4 09/13/2022 06:13 PM    NBEA 2 09/13/2022 06:13 PM    PBEA 1 09/08/2022 01:23 PM    NXWN6DIT 92 09/13/2022 06:13 PM    FIO2 2.0 09/13/2022 06:13 PM     Radiology:  X-ray chest 9/15/2022      Impression:  Acute respiratory insufficiency  Status post minithoracotomy for epicardial lead pacemaker placement  Suspected obstructive sleep apnea/Obesity  ALINE cano cardiomyopathy/CHF, GERD, thyroid disease    Recommendations:  Oxygen via nasal cannula, wean as able, keep SPO2 90% or greater   Incentive spirometry every hour while awake   Chest tube management per CT   X-ray chest in am  Labs: CBC and BMP in am  DVT prophylaxis with Xarelto  Outpatient sleep apnea evaluation  Discussed with RN  Will follow with you  Electronically signed by     Fly Durant MD on 9/15/2022 at 12:37 PM  Pulmonary Critical Care and Sleep Medicine,  [facility]  Cell: [phone]  Office: [phone]

## 2022-09-15 NOTE — PROGRESS NOTES
[facility] Cardiothoracic Surgery  Progress Note    9/15/2022 4:11 PM  \  Subjective:  Ms. Divya Rene     Objective:  /86   Pulse 78   Temp 97.8 °F (36.6 °C) (Temporal)   Resp 17   Ht 5' 3\" (1.6 m)   Wt 182 lb 14.4 oz (83 kg)   SpO2 96%   BMI 32.40 kg/m²   Chest: pacing wires: no, chest tubes:no, air leak no, 0 +  CV: no murmur noted, Normal S1, S2,   Lungs: clear to auscultation, no wheezes, rales, or rhonchi  Abd: normal bowel sounds   Lower Extremities: Trace edema  Saph Incison: no sign of drainage or infection  Sternal Incison: dressing applied-no excessive drainage or signs of infection noted    Labs: Labs reviewed today  CBC:   Recent Labs     09/14/22 0315   WBC 15.6*   HGB 12.8   HCT 39.6   MCV 90.4        BMP:   Recent Labs     09/14/22 0315      K 4.0      CO2 24   BUN 8   CREATININE 0.71       I/O: I/O last 3 completed shifts:  In: -   Out: 3318 [Urine:2840; Chest Tube:478]  Scheduled Meds:   potassium chloride  30 mEq Oral Daily    rivaroxaban  20 mg Oral Daily    benztropine  0.5 mg Oral Daily    traZODone  50 mg Oral Nightly    levothyroxine  25 mcg Oral Daily    digoxin  125 mcg Oral Daily    bumetanide  1 mg Oral Daily    metoprolol succinate  12.5 mg Oral Daily    spironolactone  12.5 mg Oral Daily    sodium chloride flush  5-40 mL IntraVENous 2 times per day    famotidine  20 mg Oral BID    Or    famotidine (PEPCID) injection  20 mg IntraVENous BID     Continuous Infusions:   sodium chloride       PRN Meds:sodium chloride flush, sodium chloride, oxyCODONE **OR** oxyCODONE, morphine, ondansetron **OR** ondansetron, magnesium hydroxide, bisacodyl      Daily Nursing Care:  Please keep SCDS in place as DVT prophylaxis  If not intubated, Please have patient use IS and acapella 10X/hr or during commercial breaks  Please continue BM management  Daily labs and CXR  Daily PT/OT and ambulation  Continue with Case Management for DC planning      Assessment/ Plan:  Resume home meds  Eval to restarted Xarelto in the AM  Eval CXR today after CT removal  Eval for DC tomorrow  Plan will be home with family        21 min  [address] [facility], [place] - NP

## 2022-09-15 NOTE — PROGRESS NOTES
Start Of Shift Note  [facility]    Summary: Pt is updated on plan of care, today's physicians, and today's goal of potential chest tube removal written on whiteboard. Pt denies new pain, but still experiencing some pain from surgical incisions and chest tube on left side of chest. Patient is not asking for pain medications. Diet changed from puree to easy chew. She is Aox4; radial, pedal, and tibial pulses are +2, neurovascularly intact, +1 swelling to BLE with no calf tenderness. Pt ambulates up to the bathroom and tolerates well. Pt returned to bed with HOB at 60, feet elevated, watching television. Standard safety measures in place.      Vitals:    Vitals:    09/15/22 0700 09/15/22 0800 09/15/22 0833 09/15/22 1000   BP: 109/61 112/71 112/71 102/71   Pulse: 73 73 83 89   Resp: 12 10  10   Temp:  97 °F (36.1 °C)     TempSrc:  Temporal     SpO2: 95% 98%  98%   Weight:       Height:            I&O:   Intake/Output Summary (Last 24 hours) at 9/15/2022 1053  Last data filed at 9/15/2022 0800  Gross per 24 hour   Intake --   Output 1370 ml   Net -1370 ml       Resp Status: 2LNC    Ventilator Settings:     / / /     Critical Care IV infusions:   sodium chloride          LDA:   CVC Triple Lumen 09/13/22 Right Internal jugular (Active)   Number of days: 2       Peripheral IV 09/13/22 Right Forearm (Active)   Number of days: 1       Chest Tube Left Pleural 1 (Active)   Number of days: 2       Incision 09/13/22 Chest Lateral;Left;Lower (Active)   Number of days: 2       Incision Chest Left;Upper (Active)   Number of days: 2

## 2022-09-15 NOTE — PROGRESS NOTES
End Of Shift Note  [address] [facility]  Summary of shift: Pt's chest tube removed with a total of 575mL in the water seal chamber. Dressing applied with steri strips, fluff gauze, and abd pad. Pt tolerated well. Three hours after chest tube removal, CXR came back unremarkable with no indications of pneumothorax. Pt had then ambulated 25ft in the hallway without shortness of breath. After walking, Pt denied discomfort or pain. Checked chest tube removal dressing and it does not have any new drainage or fluid leaking. Vitals:    Vitals:    09/15/22 1530 09/15/22 1600 09/15/22 1700 09/15/22 1800   BP:  108/61 105/69    Pulse: 78 79 78    Resp: 17 14 16    Temp:  98.1 °F (36.7 °C)     TempSrc:  Temporal     SpO2: 96% 97% 94% 96%   Weight:       Height:            I&O:   Intake/Output Summary (Last 24 hours) at 9/15/2022 1818  Last data filed at 9/15/2022 1300  Gross per 24 hour   Intake --   Output 1800 ml   Net -1800 ml       Resp Status: Required 1L NC while eating, otherwise has been on room air.      Ventilator Settings:     / / /     Critical Care IV infusions:   sodium chloride          LDA:   CVC Triple Lumen 09/13/22 Right Internal jugular (Active)   Number of days: 2       Peripheral IV 09/13/22 Right Forearm (Active)   Number of days: 2       Incision 09/13/22 Chest Lateral;Left;Lower (Active)   Number of days: 2       Incision Chest Left;Upper (Active)   Number of days:

## 2022-09-15 NOTE — PROGRESS NOTES
[address] [place]  [phone]          Progress Note    Patient Name:  Destiney Santa    :  1945  9/15/2022 4:45 PM      SUBJECTIVE       Ms. Emma Simmons  has no chest pain, shortness of breath, palpitations, nausea or vomiting      OBJECTIVE     Vital signs:    /86   Pulse 78   Temp 97.8 °F (36.6 °C) (Temporal)   Resp 17   Ht 5' 3\" (1.6 m)   Wt 182 lb 14.4 oz (83 kg)   SpO2 96%   BMI 32.40 kg/m²  2 L/min  .tro    Admit Weight:  187 lb 13.3 oz (85.2 kg)    Last 3 weights: Wt Readings from Last 3 Encounters:   09/15/22 182 lb 14.4 oz (83 kg)   22 187 lb 13.3 oz (85.2 kg)   22 195 lb (88.5 kg)       BMI: Body mass index is 32.4 kg/m². Input/Output:       Intake/Output Summary (Last 24 hours) at 9/15/2022 1645  Last data filed at 9/15/2022 1200  Gross per 24 hour   Intake --   Output 1350 ml   Net -1350 ml       Date 09/15/22 0000 - 09/15/22 2359   Shift 2653-6855 6535-4880 5121-3788 24 Hour Total   INTAKE   Shift Total(mL/kg)       OUTPUT   Urine(mL/kg/hr) 450(0.7)   450   Chest Tube 50 50  100   Shift Total(mL/kg) 500(6) 50(0.6)  550(6.6)   Weight (kg) 83 83 83 83     Exam:     General appearance: awake and alert moves all ext   Lungs: no rhonchi, no wheezes, no rales  Heart: S1 and S2 no murmur  Abdomen: positive bowel sounds, no bruits, no masses  Extremities: warm and dry, no cyanosis, no clubbing        Laboratory Studies:     CBC:   Recent Labs     22   WBC 15.6*   HGB 12.8   HCT 39.6   MCV 90.4        BMP:   Recent Labs     22      K 4.0      CO2 24   BUN 8   CREATININE 0.71     PT/INR: No results for input(s): PROTIME, INR in the last 72 hours. APTT: No results for input(s): APTT in the last 72 hours. MAG: No results for input(s): MG in the last 72 hours. D Dimer: No results for input(s): DDIMER in the last 72 hours.   Troponin  No results for input(s): TROPHS in the last 72 hours. BNP No results for input(s): BNP in the last 72 hours. No results for input(s): PROBNP in the last 72 hours. Pulse Ox: SpO2  Av.1 %  Min: 88 %  Max: 99 %  Supplemental O2: O2 Flow Rate (L/min): 2 L/min     Current Meds:    potassium chloride  30 mEq Oral Daily    rivaroxaban  20 mg Oral Daily    benztropine  0.5 mg Oral Daily    traZODone  50 mg Oral Nightly    levothyroxine  25 mcg Oral Daily    digoxin  125 mcg Oral Daily    bumetanide  1 mg Oral Daily    metoprolol succinate  12.5 mg Oral Daily    spironolactone  12.5 mg Oral Daily    sodium chloride flush  5-40 mL IntraVENous 2 times per day    famotidine  20 mg Oral BID    Or    famotidine (PEPCID) injection  20 mg IntraVENous BID     Continuous Infusions:    sodium chloride           XR CHEST PORTABLE    Result Date: 9/15/2022  No evidence of a chest tube within the chest cavity. Please see full report above. XR CHEST PORTABLE    Result Date: 2022  Stable cardiomegaly. No focal consolidation. XR CHEST PORTABLE    Result Date: 2022  More prominent cardiac silhouette enlargement and opacification in the right lung base for which atelectasis and/or effusion may be present. Echo:      ASSESSMENT     Principal Problem:    Thoracoscopic surgical procedure converted to open procedure  Active Problems:    Longstanding persistent atrial fibrillation (HCC)  Resolved Problems:    * No resolved hospital problems. *      PLAN       Status post epicardial LV lead placement. Doing well postoperatively. Okay with resuming Xarelto in a.m. if no evidence of bleeding. Discussed case with Dr. Stephany Abebe with plans for AV erika ablation next month. Continue supportive care. Will continue to follow with you.       Electronically signed by Zaida Landaverde MD on 9/15/2022 at 4:45 PM

## 2022-09-15 NOTE — PLAN OF CARE
0800  Verbalizes/displays adequate comfort level or baseline comfort level:   Encourage patient to monitor pain and request assistance   Assess pain using appropriate pain scale   Implement non-pharmacological measures as appropriate and evaluate response   Administer analgesics based on type and severity of pain and evaluate response   Notify Licensed Independent Practitioner if interventions unsuccessful or patient reports new pain  Note: Patient not inquiring for pain medication  9/15/2022 0229 by Nolan Hobson RN  Flowsheets (Taken 9/15/2022 0229)  Verbalizes/displays adequate comfort level or baseline comfort level:   Encourage patient to monitor pain and request assistance   Assess pain using appropriate pain scale   Administer analgesics based on type and severity of pain and evaluate response   Implement non-pharmacological measures as appropriate and evaluate response   Consider cultural and social influences on pain and pain management   Notify Licensed Independent Practitioner if interventions unsuccessful or patient reports new pain     Problem: Skin/Tissue Integrity  Goal: Absence of new skin breakdown  Description: 1. Monitor for areas of redness and/or skin breakdown  2. Assess vascular access sites hourly  3. Every 4-6 hours minimum:  Change oxygen saturation probe site  4. Every 4-6 hours:  If on nasal continuous positive airway pressure, respiratory therapy assess nares and determine need for appliance change or resting period. 9/15/2022 1522 by Emerson Fleming RN  Outcome: Progressing  9/15/2022 0229 by Nolan Hobson RN  Note: Pt turning frequently and ambulates multiple times a day. Protective dressings applied to bony prominences.       Problem: Safety - Adult  Goal: Free from fall injury  9/15/2022 1522 by Emerson Fleming RN  Outcome: Progressing  Flowsheets (Taken 9/15/2022 1200)  Free From Fall Injury: Instruct family/caregiver on patient safety  9/15/2022 0229 by Gladys Hargrove Lynette Floras, RN  Flowsheets (Taken 9/15/2022 0229)  Free From Fall Injury:   Instruct family/caregiver on patient safety   Based on caregiver fall risk screen, instruct family/caregiver to ask for assistance with transferring infant if caregiver noted to have fall risk factors     Problem: ABCDS Injury Assessment  Goal: Absence of physical injury  9/15/2022 1522 by Sissy Dalal RN  Outcome: Progressing  Flowsheets (Taken 9/15/2022 1200)  Absence of Physical Injury: Implement safety measures based on patient assessment  9/15/2022 0229 by Fred Garcia RN  Flowsheets (Taken 9/15/2022 0229)  Absence of Physical Injury: Implement safety measures based on patient assessment

## 2022-09-15 NOTE — PROGRESS NOTES
[facility] NOTE    Room # 2030/2030-01   Name: Keira Gardner            Quaker: [facility]     Reason for visit: Routine    I visited the patient. Admit Date & Time: 9/13/2022 12:17 PM    Assessment:  Keira Gardner is a 78 y.o. female in the hospital because she has atrial fib. Upon entering the room pt. Is found resting in room and open to visit. Intervention:  I introduced myself and my title as  I offered space for patient  to express feelings, needs, and concerns and provided a ministry presence. Patient reports that she is very tired but appreciates prayer. Outcome:  Pt. Calm and coping. Plan:  Chaplains will remain available to offer spiritual and emotional support as needed. Electronically signed by Leandra Rodriguez on 9/14/2022 at 8:40 PM.  Diana      09/14/22 2038   Encounter Summary   Service Provided For: Patient   Referral/Consult From: [facility]   [facility]   Last Encounter  09/14/22   Complexity of Encounter Low   Begin Time 1400   End Time  1415   Total Time Calculated 15 min   Encounter    Type Initial Screen/Assessment   Spiritual/Emotional needs   Type Spiritual Support   Assessment/Intervention/Outcome   Assessment Anxious; Impaired resilience; Impaired social interaction   Intervention Active listening;Discussed illness injury and its impact; Explored/Affirmed feelings, thoughts, concerns;Sustaining Presence/Ministry of presence;Nurtured Hope   Outcome Comfort;Coping;Expressed feelings of Page, Peace and/or Love;Receptive

## 2022-09-15 NOTE — PROGRESS NOTES
Transitions of Care Pharmacy Service   Medication Review    The patient's list of current home medications has been reviewed. Source(s) of information: patient (interviewed 9/14), Surescripts refill report      Other Notes Trifluoperazine was originally prescribed 1mg BID but pt states her physician instructed her to decrease to 1mg HS only         PROVIDER ACTION REQUESTED  Medications that need to be addressed by a physician/nurse practitioner:    Medication Action Requested   Spironolactone 12.5mg daily     Current home dose is 25mg daily instead -- consider adjusting if appropriate         Please feel free to call me with any questions about this encounter. Thank you.     Lyndsey Conn, [facility]   Transitions of Care Pharmacy Service  Phone:  [phone]  Fax: [phone]      Electronically signed by Lyndsey Conn [facility] on 9/15/2022 at 7:46 PM           Medications Prior to Admission:   levothyroxine (SYNTHROID) 25 MCG tablet, Take 25 mcg by mouth daily  benztropine (COGENTIN) 0.5 MG tablet, Take 0.5 mg by mouth daily  bumetanide (BUMEX) 1 MG tablet, Take 1 mg by mouth daily  digoxin (LANOXIN) 125 MCG tablet, Take 125 mcg by mouth daily  metoprolol succinate (TOPROL XL) 25 MG extended release tablet, Take 12.5 mg by mouth every evening  potassium chloride (KLOR-CON M) 10 MEQ extended release tablet, Take 30 mEq by mouth daily  rivaroxaban (XARELTO) 20 MG TABS tablet, Take 20 mg by mouth daily  spironolactone (ALDACTONE) 25 MG tablet, Take 25 mg by mouth daily  traZODone (DESYREL) 50 MG tablet, Take 50 mg by mouth nightly  trifluoperazine (STELAZINE) 1 MG tablet, Take 1 mg by mouth nightly

## 2022-09-16 ENCOUNTER — APPOINTMENT (OUTPATIENT)
Dept: GENERAL RADIOLOGY | Age: 77
DRG: 261 | End: 2022-09-16
Attending: THORACIC SURGERY (CARDIOTHORACIC VASCULAR SURGERY)
Payer: COMMERCIAL

## 2022-09-16 VITALS
HEART RATE: 75 BPM | RESPIRATION RATE: 20 BRPM | HEIGHT: 63 IN | DIASTOLIC BLOOD PRESSURE: 72 MMHG | BODY MASS INDEX: 32.25 KG/M2 | WEIGHT: 182 LBS | TEMPERATURE: 97.7 F | OXYGEN SATURATION: 92 % | SYSTOLIC BLOOD PRESSURE: 125 MMHG

## 2022-09-16 LAB
ABSOLUTE EOS #: 0.16 K/UL (ref 0–0.44)
ABSOLUTE IMMATURE GRANULOCYTE: 0.04 K/UL (ref 0–0.3)
ABSOLUTE LYMPH #: 1.25 K/UL (ref 1.1–3.7)
ABSOLUTE MONO #: 0.75 K/UL (ref 0.1–1.2)
ANION GAP SERPL CALCULATED.3IONS-SCNC: 10 MMOL/L (ref 9–17)
BASOPHILS # BLD: 1 % (ref 0–2)
BASOPHILS ABSOLUTE: 0.05 K/UL (ref 0–0.2)
BUN BLDV-MCNC: 10 MG/DL (ref 8–23)
BUN/CREAT BLD: 19 (ref 9–20)
CALCIUM SERPL-MCNC: 8.4 MG/DL (ref 8.6–10.4)
CHLORIDE BLD-SCNC: 100 MMOL/L (ref 98–107)
CO2: 25 MMOL/L (ref 20–31)
CREAT SERPL-MCNC: 0.53 MG/DL (ref 0.5–0.9)
EOSINOPHILS RELATIVE PERCENT: 2 % (ref 1–4)
GFR AFRICAN AMERICAN: >60 ML/MIN
GFR NON-AFRICAN AMERICAN: >60 ML/MIN
GFR SERPL CREATININE-BSD FRML MDRD: ABNORMAL ML/MIN/{1.73_M2}
GLUCOSE BLD-MCNC: 112 MG/DL (ref 70–99)
HCT VFR BLD CALC: 36.8 % (ref 36.3–47.1)
HEMOGLOBIN: 12.5 G/DL (ref 11.9–15.1)
IMMATURE GRANULOCYTES: 0 %
LYMPHOCYTES # BLD: 12 % (ref 24–43)
MCH RBC QN AUTO: 29.6 PG (ref 25.2–33.5)
MCHC RBC AUTO-ENTMCNC: 34 G/DL (ref 28.4–34.8)
MCV RBC AUTO: 87.2 FL (ref 82.6–102.9)
MONOCYTES # BLD: 7 % (ref 3–12)
NRBC AUTOMATED: 0 PER 100 WBC
PDW BLD-RTO: 14.7 % (ref 11.8–14.4)
PLATELET # BLD: 172 K/UL (ref 138–453)
PMV BLD AUTO: 11.5 FL (ref 8.1–13.5)
POTASSIUM SERPL-SCNC: 3.7 MMOL/L (ref 3.7–5.3)
RBC # BLD: 4.22 M/UL (ref 3.95–5.11)
RBC # BLD: ABNORMAL 10*6/UL
SEG NEUTROPHILS: 78 % (ref 36–65)
SEGMENTED NEUTROPHILS ABSOLUTE COUNT: 8.58 K/UL (ref 1.5–8.1)
SODIUM BLD-SCNC: 135 MMOL/L (ref 135–144)
WBC # BLD: 10.8 K/UL (ref 3.5–11.3)

## 2022-09-16 PROCEDURE — 85025 COMPLETE CBC W/AUTO DIFF WBC: CPT

## 2022-09-16 PROCEDURE — 2580000003 HC RX 258: Performed by: NURSE PRACTITIONER

## 2022-09-16 PROCEDURE — 36415 COLL VENOUS BLD VENIPUNCTURE: CPT

## 2022-09-16 PROCEDURE — 80048 BASIC METABOLIC PNL TOTAL CA: CPT

## 2022-09-16 PROCEDURE — 6370000000 HC RX 637 (ALT 250 FOR IP): Performed by: NURSE PRACTITIONER

## 2022-09-16 PROCEDURE — 71045 X-RAY EXAM CHEST 1 VIEW: CPT

## 2022-09-16 PROCEDURE — 99024 POSTOP FOLLOW-UP VISIT: CPT | Performed by: NURSE PRACTITIONER

## 2022-09-16 RX ORDER — OXYCODONE HYDROCHLORIDE 5 MG/1
5 TABLET ORAL EVERY 8 HOURS PRN
Qty: 15 TABLET | Refills: 0 | Status: SHIPPED | OUTPATIENT
Start: 2022-09-16 | End: 2022-09-21

## 2022-09-16 RX ORDER — FAMOTIDINE 20 MG/1
20 TABLET, FILM COATED ORAL 2 TIMES DAILY
Qty: 60 TABLET | Refills: 3 | Status: SHIPPED | OUTPATIENT
Start: 2022-09-16

## 2022-09-16 RX ADMIN — BENZTROPINE MESYLATE 0.5 MG: 0.5 TABLET ORAL at 09:23

## 2022-09-16 RX ADMIN — LEVOTHYROXINE SODIUM 25 MCG: 0.03 TABLET ORAL at 06:39

## 2022-09-16 RX ADMIN — SODIUM CHLORIDE, PRESERVATIVE FREE 10 ML: 5 INJECTION INTRAVENOUS at 09:33

## 2022-09-16 RX ADMIN — POTASSIUM CHLORIDE 30 MEQ: 750 TABLET, FILM COATED, EXTENDED RELEASE ORAL at 09:25

## 2022-09-16 RX ADMIN — DIGOXIN 125 MCG: 125 TABLET ORAL at 09:23

## 2022-09-16 RX ADMIN — Medication 12.5 MG: at 09:25

## 2022-09-16 RX ADMIN — FAMOTIDINE 20 MG: 20 TABLET, FILM COATED ORAL at 09:23

## 2022-09-16 RX ADMIN — Medication 12.5 MG: at 09:23

## 2022-09-16 RX ADMIN — BUMETANIDE 1 MG: 1 TABLET ORAL at 09:25

## 2022-09-16 ASSESSMENT — PAIN SCALES - GENERAL
PAINLEVEL_OUTOF10: 0

## 2022-09-16 NOTE — PROGRESS NOTES
Discharge Note:      All discharge instructions given at this time as well as all patient belongings returned to patient. Pt denies any further questions regarding discharge at this time. Pt given discharge packet with unit letter, discharge instructions/restrictions, and medication handouts regarding all discharge medications and side effects. Pt denies any further issues at this time. Pt discharged with dressing change supplies. Pt wheeled out to front discharge doors at this time. Pt left premises without any issues in private vehicle at this time.

## 2022-09-16 NOTE — FLOWSHEET NOTE
09/16/22 1530   Incision Chest Left;Upper   No Date First Assessed or Time First Assessed found. Present on Hospital Admission: No  Location: Chest  Incision Location Orientation: Left;Upper   Dressing Status Clean;Dry; Intact; New dressing applied   Incision Cleansed Soap and water   Dressing/Treatment ABD pad;Gauze dressing/dressing sponge   Closure Sutures;Surgical glue   Margins Approximated   Incision Assessment Dry   Drainage Amount None   Odor None   Carmen-incision Assessment Intact       Dressing changed 9/16/22 prior to discharge. Pt tolerated well. Sent home with dressing change materials.

## 2022-09-16 NOTE — OP NOTE
[id number] [facility] [address]                [address]                                OPERATIVE REPORT    PATIENT NAME: Elissa Yu                     :        1945  MED REC NO:   [id number]                             ROOM:       2030  ACCOUNT NO:   [de-identified]                           ADMIT DATE: 2022  PROVIDER:     Estela Sylvester MD    DATE OF PROCEDURE:  2022    PREOPERATIVE DIAGNOSES:  1.  Left-sided LV lead placement x2 via left minithoracotomy. 2.  Exposure of pacemaker in connection of LV lead. Post -op Diagnoses: same as above    SURGEON:  Estela Sylvester MD    ASSISTANT:  Naida Lyles RN    EBL:  200 mL. ANESTHESIA:  General.    DRAINS:  One 32-[place] chest tube. Leads used are two 35-mm epicardial screw in leads as per op note. OPERATIVE PROCEDURE:  The patient had the risks, benefits, and  intentions fully discussed. Risks include, but not limited to bleeding,  infection, damage to heart or lungs, anesthetic risks. The patient  understands the risks, signed consent, and was taken back to the  operating room and placed in supine position. Double-lumen endotracheal  tube, central line IV and Qureshi were placed. The patient was placed in  right lateral decubitus position. All the appropriate padding and  precautions were taken. At this time, the left chest was washed, prepped and draped in normal  sterile fashion. The patient had time-out performed. Incision was made  laterally in the ninth intercostal space, continued in depth with  electrocautery. The 3-inch incision was continued. The latissimus and  serratus muscles were split. Two [place] retractors were placed and the  ribs were spread. The pericardial sac was opened. At this time, a chest tube port was placed laterally to allow access to  the posterior side of the heart. The patient had the pacemaker pocket  open.   The device was inspected and noted to be functioning well. At this time, two screws and leads were placed in the following fashion. Heart was elevated using a sponge stick posterior to heart near the  coronary sinus, one lead was placed laterally and higher posteriorly. The other one was placed at the base posteriorly. They were screwed in  individually through the chest tube site. They were then taken and  tunneled from the chest tube, from the thoracotomy port incision to the  pacemaker box. The cap from the pacemaker was removed. The LV lead is  tested. The first LV lead was noted to have intrinsic rhythm of 9.4  volts, threshold of 1.1 volts at 657 ohms with a current of 1.7 mA. The  pacemaker was then connected to the LV lead and was noted to be  functioning very well. The patient then had the 32-[place] chest tube placed through the  [place] site, sewn in place using two silk sutures. The pacer was then repositioned after the leads securely fastened to the  pacemaker and the box on top of the leads. The pocket was then closed  using a 2 and a 4-0 Vicryl running suture. The thoracotomy incision was  closed using #1 Vicryl figure-of-eight sutures. It should be noted that  40 mL of Marcaine was injected in the incision and chest tube sites for  pain relief. At this point, 2, 3, and 4-0 Vicryl sutures were used to close the  superficial layers. The chest tube was hooked to Pleur-evac suction. The patient was extubated in the operating room and taken in stable  condition to the ICU. This procedure could not be performed without the assistance of  Everette Peraza, who was invaluable assisting at the chest and also  closing.         Polly Porter MD    D: 09/15/2022 11:28:45       T: 09/15/2022 11:31:52     [id number]  Job#: [id number]     Doc#: [id number]

## 2022-09-16 NOTE — PROGRESS NOTES
Pulmonary Critical Care Progress Note  Casandra Reid MD     Patient seen for the follow up of acute respiratory insufficiency, suspected JERAMY/obesity, status post minithoracotomy for pacemaker placement    Subjective:  No significant overnight events noted. She is resting comfortably in bed no distress. Her chest tube was removed yesterday her x-ray this morning was stable. She has been weaned off of oxygen. She denies any shortness of breath. She has productive cough with clear sputum which is chronic in nature for her. Her surgical pain is well controlled. Examination:  Vitals: /84   Pulse 98   Temp 97.1 °F (36.2 °C) (Temporal)   Resp 16   Ht 5' 3\" (1.6 m)   Wt 182 lb (82.6 kg)   SpO2 98%   BMI 32.24 kg/m²   General appearance: alert and cooperative with exam  Neck: No JVD  Lungs: Moderate air exchange, no crackles or wheezing  Heart: regular rate and rhythm, S1, S2 normal, no gallop  Abdomen: Soft, non tender, + BS  Extremities: no cyanosis or clubbing.  No significant edema    LABs:  CBC:   Recent Labs     09/14/22 0315 09/16/22  0741   WBC 15.6* 10.8   HGB 12.8 12.5   HCT 39.6 36.8    172     BMP:   Recent Labs     09/14/22 0315 09/16/22  0741    135   K 4.0 3.7   CO2 24 25   BUN 8 10   CREATININE 0.71 0.53   LABGLOM >60 >60   GLUCOSE 130* 112*       ABG:  Lab Results   Component Value Date/Time    FIO2 2.0 09/13/2022 06:13 PM       Lab Results   Component Value Date/Time    POCPH 7.335 09/13/2022 06:13 PM    POCPCO2 45.7 09/13/2022 06:13 PM    POCPO2 69.4 09/13/2022 06:13 PM    POCHCO3 24.4 09/13/2022 06:13 PM    NBEA 2 09/13/2022 06:13 PM    PBEA 1 09/08/2022 01:23 PM    TJOD8ANQ 92 09/13/2022 06:13 PM    FIO2 2.0 09/13/2022 06:13 PM     Radiology:  X-ray chest 9/15/2022      Impression:  Acute respiratory insufficiency  Status post minithoracotomy for epicardial lead pacemaker placement  Suspected obstructive sleep apnea/Obesity  A. fib, cardiomyopathy/CHF, GERD, thyroid disease    Recommendations:  Monitor off of oxygen  Incentive spirometry every hour while awake   Status post chest tube removal 9/15/2022  DVT prophylaxis with Xarelto  Outpatient sleep apnea evaluation  No objection to discharge from pulmonary standpoint, outpatient follow-up in 2 weeks  Discussed with RN  Will follow with you  Electronically signed by     Deniz Garcia MD on 9/16/2022 at 3:30 PM  Pulmonary Critical Care and Sleep Medicine,  [facility]  Cell: [phone]  Office: [phone]

## 2022-09-16 NOTE — PROGRESS NOTES
Spoke with Dr Marbella Delacruz. Renato Webber for discharge from cardiac standpoint. Follow up with Dr Gina Moser in office as scheduled.

## 2022-09-16 NOTE — PROGRESS NOTES
[address] CVICU  Summary of shift: Patient up to chair to eat breakfast and lunch. Ambulates independent of assistamce or walker to the bathroom, back to chair, or back to bed. Pt experiencing mild discomfort on her left lateral side due to recent surgery and incisions. Chest tube removed 9/15/22. All dressings are requiring changes today and will be performed prior to discharge. Pt will be picked up by her son, Kyler Marie, around 4pm.     No new findings on assessment. Pt is AOx4, pedal and radial pulses +2, +1 non-pitting swelling to bilateral lower extremities, active bowel sounds and experiencing constipation. Pt offered pharmaceutical remedies (stool softener, stimulant laxative) for constipation, but Pt declined saying she'd be worried about getting to the toilet in time.      Vitals:    Vitals:    09/16/22 1000 09/16/22 1100 09/16/22 1200 09/16/22 1300   BP: 112/80 104/68 125/72    Pulse: 91 80 89 87   Resp: 17 13 24 15   Temp:   97.7 °F (36.5 °C)    TempSrc:   Temporal    SpO2: 96% 96% 96% 96%   Weight:       Height:            I&O:   Intake/Output Summary (Last 24 hours) at 9/16/2022 1430  Last data filed at 9/16/2022 0400  Gross per 24 hour   Intake --   Output 450 ml   Net -450 ml       Resp Status: Room air    Ventilator Settings:     / / /     Critical Care IV infusions:   sodium chloride          LDA:   Peripheral IV 09/13/22 Right Forearm (Active)   Number of days: 3       Incision 09/13/22 Chest Lateral;Left;Lower (Active)   Number of days: 3       Incision Chest Left;Upper (Active)   Number of days:

## 2022-09-16 NOTE — PROGRESS NOTES
Safe for discharge home today         [facility] Cardiothoracic Surgery  Progress Note    9/16/2022 12:13 PM  \  Subjective:  Ms. Tiffanie Ferrer   No CP or SOB. Feels ready for DC home  Objective:  /84   Pulse 98   Temp 97.7 °F (36.5 °C) (Temporal)   Resp 16   Ht 5' 3\" (1.6 m)   Wt 182 lb (82.6 kg)   SpO2 98%   BMI 32.24 kg/m²   Chest: pacing wires: no, chest tubes:no, air leak no, 0 +  CV: no murmur noted, Normal S1, S2,   Lungs: clear to auscultation, no wheezes, rales, or rhonchi  Abd: normal bowel sounds   Lower Extremities: Trace edema  Saph Incison: no sign of drainage or infection  Sternal Incison: dressing applied-no excessive drainage or signs of infection noted    Labs: Labs reviewed today  CBC:   Recent Labs     09/14/22 0315 09/16/22  0741   WBC 15.6* 10.8   HGB 12.8 12.5   HCT 39.6 36.8   MCV 90.4 87.2    172     BMP:   Recent Labs     09/14/22 0315 09/16/22  0741    135   K 4.0 3.7    100   CO2 24 25   BUN 8 10   CREATININE 0.71 0.53       I/O: I/O last 3 completed shifts:  In: -   Out: 1950 [Urine:1800;  Chest Tube:150]  Scheduled Meds:   potassium chloride  30 mEq Oral Daily    rivaroxaban  20 mg Oral Daily    benztropine  0.5 mg Oral Daily    traZODone  50 mg Oral Nightly    levothyroxine  25 mcg Oral Daily    digoxin  125 mcg Oral Daily    bumetanide  1 mg Oral Daily    metoprolol succinate  12.5 mg Oral Daily    spironolactone  12.5 mg Oral Daily    sodium chloride flush  5-40 mL IntraVENous 2 times per day    famotidine  20 mg Oral BID    Or    famotidine (PEPCID) injection  20 mg IntraVENous BID     Continuous Infusions:   sodium chloride       PRN Meds:acetaminophen, sodium chloride flush, sodium chloride, oxyCODONE **OR** oxyCODONE, morphine, ondansetron **OR** ondansetron, magnesium hydroxide, bisacodyl      Daily Nursing Care:  Please keep SCDS in place as DVT prophylaxis  If not intubated, Please have patient use IS and acapella 10X/hr or during commercial breaks  Please continue BM management  Daily labs and CXR  Daily PT/OT and ambulation  Continue with Case Management for DC planning      Assessment/ Plan:  Safe to DC home today   Follow up made  Pt going home with supportive family.         20 min  201 SATURNINO Huang - NP

## 2022-09-16 NOTE — FLOWSHEET NOTE
09/16/22 1530   Incision 09/13/22 Chest Lateral;Left;Lower   Date First Assessed: 09/13/22   Present on Hospital Admission: No  Location: Chest  Incision Location Orientation: Lateral;Left;Lower   Dressing Status Clean;Dry; Intact   Incision Cleansed Soap and water  (chlorohexidine soap and water)   Dressing/Treatment   (fluff gauze, abd, tape)   Closure Sutures;Surgical glue   Margins Approximated   Incision Assessment Dry   Drainage Amount None   Odor None   Carmen-incision Assessment Intact       Dressing changed 9/16/22 prior to discharge. Pt tolerated well. Sent home with dressing change materials.

## 2022-09-16 NOTE — DISCHARGE INSTRUCTIONS
ACTIVITY/EXERCISE   ? It will take 2 to 3 months to feel like you did before surgery in terms of energy and strength. ?    Slowly increase your activity after you go home. Pace yourself, listen to your body. If it hurts, stop. During the first 2 months, no digging, outside bike riding, or using arm movement on  a stationary bike for sternal precautions. ?   The limit on how much you can lift, push or pull is 10 pounds. For the first 4 weeks after surgery, you must not lift anything over 10 pounds. (You cannot lift anything heavier than a gallon of  milk). Beginning the 5th week after surgery, you may lift, push or pull up to 20 pounds. ? Begin your walking program on the first day you are home and record how many times per day and number of minutes on your log. You may climb stairs; there are no limits to stair climbing. ? Continue to do your cough and deep breathing exercises and the incentive spirometer 6 times a day as you did in the hospital.   ?    Do not use arms to get up from a seated position. Instead, rock in your chair to give yourself momentum to sit up.   ?    You may begin light house hold chores, washing a few dishes, dusting, setting the table or folding laundry. ?    You can have sex when it is comfortable for you. The amount of stress on the heart during sex is about the same as climbing 2 flights of stairs. APPETITE   ? Your appetite might be decreased at first.  It should slowly improve. ? Small, frequent meals, as well as cold foods, may help. ?   The dietitian will assist you with a low fat, low cholesterol, low salt diet. ? Consult your open heart binder for diet instructions. TEMPERATURE   ? Take your temperature at the same time each day. Take your temperature at 8 a.m. (morning) and 8 p.m. (evening). WEIGHT   ? Weigh yourself when you awaken in the morning and record in your daily log. PAIN   ? You may have pain at the incision. during the night, take your pain medication at bedtime. SUPPORT STOCKINGS   ? Wear at home for 1 month and when the swelling in your legs go away. Take them off at night when you go to bed. ?   A family member needs to put them on you, it takes more than 10 pounds of pressure to put them on.   ? Hand or machine wash. Line dry. SWELLING OF LEGS   ? It is common to have leg swelling, especially if you have a leg incision. ? It is helpful to keep your legs elevated when you are sitting or lie down and elevate your legs on pillows. CALL YOUR SURGEON IF YOU HAVE:   ? Rapid heart rate or fluttering in your chest   ? Fever over 101° F.   ? Weight gain or loss of 3 pounds overnight or 5      pounds in one week   ? Persistent nausea or vomiting        ? ANY NEW STERNAL DRAINAGE   ? Excessive LEG drainage or very red incision   ? Increasing shortness of breath   ? Pain unrelieved by prescribed medication    OFFICE VISITS   ? BRING YOUR MEDICATION LIST and medications even over the counter medications to all your follow up appointments. ?    You should have a follow up appointment in the office in about 1 month after discharge from the hospital.   Office Location:  [address] [place] [address] 372 # [phone], [place], [address] [facility]   ? You may call the office to make an appointment, if you don't have an appointment. [phone] You will need to have a chest xray and labs completed 3-7 days before your follow up appointment. ?    Bring any questions you have so they may be addressed. ? You should have a follow up appointment with your primary care doctor 7-10 days from discharge, please call and make one if you do not have one.   ?   You should have a  follow up appointment with your Cardiologist 2-3 weeks from discharge, please call and make one if you do not have one.   ?   Cardiac Rehab will set up a follow up time for you to begin your rehabilitation program.         EMERGENCIES   ? You should either call 911 or go to the Emergency Room to be evaluated if you need seen immediately. ?  Sudden, severe shortness of breath go to the Emergency Room. If you have questions regarding these instructions, please call our office  [phone]. We have an answering service 24 hours a day to get your calls to the ON Call Physician, but we are often in surgery and it takes us awhile to get back to you.

## 2022-09-17 LAB
ABO/RH: NORMAL
ANTIBODY SCREEN: NEGATIVE
ARM BAND NUMBER: NORMAL
BLD PROD TYP BPU: NORMAL
BLD PROD TYP BPU: NORMAL
BPU ID: NORMAL
BPU ID: NORMAL
CROSSMATCH RESULT: NORMAL
CROSSMATCH RESULT: NORMAL
DISPENSE STATUS BLOOD BANK: NORMAL
DISPENSE STATUS BLOOD BANK: NORMAL
EXPIRATION DATE: NORMAL
TRANSFUSION STATUS: NORMAL
TRANSFUSION STATUS: NORMAL
UNIT DIVISION: 0
UNIT DIVISION: 0

## 2022-09-29 ENCOUNTER — OFFICE VISIT (OUTPATIENT)
Dept: CARDIOTHORACIC SURGERY | Age: 77
End: 2022-09-29

## 2022-09-29 VITALS
DIASTOLIC BLOOD PRESSURE: 82 MMHG | TEMPERATURE: 97.1 F | RESPIRATION RATE: 17 BRPM | OXYGEN SATURATION: 96 % | SYSTOLIC BLOOD PRESSURE: 122 MMHG | BODY MASS INDEX: 32.25 KG/M2 | HEART RATE: 86 BPM | WEIGHT: 182 LBS | HEIGHT: 63 IN

## 2022-09-29 DIAGNOSIS — G89.18 POST-OP PAIN: Primary | ICD-10-CM

## 2022-09-29 PROCEDURE — 99024 POSTOP FOLLOW-UP VISIT: CPT | Performed by: NURSE PRACTITIONER

## 2022-09-29 RX ORDER — POTASSIUM CHLORIDE 750 MG/1
TABLET, FILM COATED, EXTENDED RELEASE ORAL
COMMUNITY
Start: 2022-09-27

## 2022-10-03 RX ORDER — TRAMADOL HYDROCHLORIDE 50 MG/1
50 TABLET ORAL EVERY 8 HOURS PRN
Qty: 15 TABLET | Refills: 0 | Status: SHIPPED | OUTPATIENT
Start: 2022-10-03 | End: 2022-10-08

## 2022-10-03 RX ORDER — LIDOCAINE 50 MG/G
1 PATCH TOPICAL DAILY
Qty: 30 PATCH | Refills: 0 | Status: SHIPPED | OUTPATIENT
Start: 2022-10-03 | End: 2022-11-02

## 2022-10-03 NOTE — PROGRESS NOTES
[address] [facility]  Office Visit      Subjective:  Ms. Oz Rodriguez is a 78 y.o. female s/p VATS surgery with Dr. Bernabe Ham. Patient has no pneumothorax present. Patient is recovering well at home. Patient follows up with independent cardiologist regarding heart failure management. All questions and concerns answered no drainage from incision sites. Patient is recovering well at home with family support  Physical Exam  Vitals:  Vitals:    09/29/22 1100   BP: 122/82   Pulse: 86   Resp: 17   Temp: 97.1 °F (36.2 °C)   SpO2: 96%       General: Alert and Oriented x3. Ambulatory. No apparent distress. Chest:  No abnormality. Equal and symmetric expansion with respiration. Lungs:  Clear to auscultation. Cardiac:  Regular rate and rhythm without murmurs, rubs or gallops. Abdomen:  Soft, non-tender, normoactive bowel sounds. Extremities:  No edema. Intact pulses in all four extremities. Psychiatric: Mood and affect are appropriate.   Surgical incision is clean dry intact no discharge or bleeding    Current Medications:    Current Outpatient Medications:     potassium chloride (KLOR-CON) 10 MEQ extended release tablet, TAKE 3 TABLETS BY MOUTH ONCE DAILY, Disp: , Rfl:     famotidine (PEPCID) 20 MG tablet, Take 1 tablet by mouth 2 times daily, Disp: 60 tablet, Rfl: 3    levothyroxine (SYNTHROID) 25 MCG tablet, Take 25 mcg by mouth daily, Disp: , Rfl:     benztropine (COGENTIN) 0.5 MG tablet, Take 0.5 mg by mouth daily, Disp: , Rfl:     bumetanide (BUMEX) 1 MG tablet, Take 1 mg by mouth daily, Disp: , Rfl:     digoxin (LANOXIN) 125 MCG tablet, Take 125 mcg by mouth daily, Disp: , Rfl:     metoprolol succinate (TOPROL XL) 25 MG extended release tablet, Take 12.5 mg by mouth every evening, Disp: , Rfl:     potassium chloride (KLOR-CON M) 10 MEQ extended release tablet, Take 30 mEq by mouth daily, Disp: , Rfl:     rivaroxaban (XARELTO) 20 MG TABS tablet, Take 20 mg by mouth daily, Disp: , Rfl: spironolactone (ALDACTONE) 25 MG tablet, Take 25 mg by mouth daily, Disp: , Rfl:     traZODone (DESYREL) 50 MG tablet, Take 50 mg by mouth nightly, Disp: , Rfl:     trifluoperazine (STELAZINE) 1 MG tablet, Take 1 mg by mouth nightly, Disp: , Rfl:     Past Surgical History:   Procedure Laterality Date    ANKLE SURGERY      CARDIAC CATHETERIZATION      CARDIAC DEFIBRILLATOR PLACEMENT  03/10/2022    BiV ICD Implant (Dr. Chip Liang - [facility])    CARDIAC ELECTROPHYSIOLOGY STUDY & DFT  03/04/2019    DFT testing for single ICD (Dr. Claudia Bedoya - [facility])    CARDIAC SURGERY N/A 9/13/2022    L V EPICARDIAL LEAD PLACEMENT AND LEFT MINI THORACOTOMY - MEDTRONIC performed by Onel Munson MD at [address] [place] [address] Bilateral     CHOLECYSTECTOMY      EYE SURGERY Bilateral     Cataract    LEG SURGERY  1984    TONSILLECTOMY         Social Hx: reports that she has never smoked.  She has never used smokeless tobacco.    Assessment & Plan:   Follow-up with cardiothoracic as needed    [address] [facility], APRN - NP,APRN CNP

## 2023-01-16 ASSESSMENT — ENCOUNTER SYMPTOMS
RESPIRATORY NEGATIVE: 1
GASTROINTESTINAL NEGATIVE: 1
ALLERGIC/IMMUNOLOGIC NEGATIVE: 1
EYES NEGATIVE: 1

## 2023-05-12 RX ORDER — FAMOTIDINE 20 MG/1
TABLET, FILM COATED ORAL
Qty: 60 TABLET | Refills: 0 | OUTPATIENT
Start: 2023-05-12

## (undated) DEVICE — SURGICAL PROCEDURE KIT BASIN MAJ SET UP

## (undated) DEVICE — 3M™ IOBAN™ 2 ANTIMICROBIAL INCISE DRAPE 6650EZ: Brand: IOBAN™ 2

## (undated) DEVICE — TROCAR: Brand: KII FIOS FIRST ENTRY

## (undated) DEVICE — DRESSING TRNSPAR W2XL2.75IN FLM SHT SEMIPERMEABLE WIND

## (undated) DEVICE — MEDI-TRACE CADENCE ADULT, DEFIBRILLATION ELECTRODE -RTS  (10 PR/PK) - PHYSIO-CONTROL: Brand: MEDI-TRACE CADENCE

## (undated) DEVICE — AGENT HEMSTAT W2XL14IN OXIDIZED REGENERATED CELOS ABSRB FOR

## (undated) DEVICE — SUTURE MCRYL SZ 4-0 L27IN ABSRB UD L19MM PS-2 1/2 CIR PRIM Y426H

## (undated) DEVICE — BLANKET WRM W40.2XL55.9IN IORT LO BODY + MISTRAL AIR

## (undated) DEVICE — SUTURE VCRL SZ 3-0 L36IN ABSRB UD L36MM CT-1 1/2 CIR J944H

## (undated) DEVICE — SOLUTION IV IRRIG POUR BRL 0.9% SODIUM CHL 2F7124

## (undated) DEVICE — POSITIONER HD W8XH4XL8.5IN RASPBERRY FOAM SLT

## (undated) DEVICE — PROTECTOR ULN NRV PUR FOAM HK LOOP STRP ANATOMICALLY

## (undated) DEVICE — DRAIN SURG SGL COLL PT TB FOR ATS BG OASIS

## (undated) DEVICE — SKIN PREP TRAY W/CHG: Brand: MEDLINE INDUSTRIES, INC.

## (undated) DEVICE — GLOVE SURG SZ 75 CRM LTX FREE POLYISOPRENE POLYMER BEAD ANTI

## (undated) DEVICE — TTL1LYR 16FR10ML 100%SIL TMPST TR: Brand: MEDLINE

## (undated) DEVICE — GARMENT,MEDLINE,DVT,INT,CALF,MED, GEN2: Brand: MEDLINE

## (undated) DEVICE — CATHETER THORACENTESIS STR 28 FRX23 IN 6 EYELET TAPR TIP LF

## (undated) DEVICE — APPLICATOR MEDICATED 26 CC SOLUTION HI LT ORNG CHLORAPREP

## (undated) DEVICE — 60 ML SYRINGE LUER-LOCK TIP: Brand: MONOJECT

## (undated) DEVICE — DRAPE,UTILTY,TAPE,15X26, 4EA/PK: Brand: MEDLINE

## (undated) DEVICE — CLIP INT M L GRN TI TRNSVRS GRV CHEVRON SHP W/ PRECIS TIP

## (undated) DEVICE — DRESSING TRNSPAR W5XL4.5IN FLM SHT SEMIPERMEABLE WIND

## (undated) DEVICE — DRESSING,GAUZE,XEROFORM,CURAD,1"X8",ST: Brand: CURAD

## (undated) DEVICE — NEEDLE FLTR 18GA L1.5IN WALL THK5UM PNK POLYPR HUB S STL